# Patient Record
Sex: MALE | Race: WHITE | NOT HISPANIC OR LATINO | Employment: OTHER | URBAN - NONMETROPOLITAN AREA
[De-identification: names, ages, dates, MRNs, and addresses within clinical notes are randomized per-mention and may not be internally consistent; named-entity substitution may affect disease eponyms.]

---

## 2017-03-15 ENCOUNTER — OFFICE VISIT (OUTPATIENT)
Dept: CARDIOLOGY | Facility: CLINIC | Age: 62
End: 2017-03-15

## 2017-03-15 VITALS
OXYGEN SATURATION: 95 % | WEIGHT: 236.8 LBS | SYSTOLIC BLOOD PRESSURE: 140 MMHG | HEART RATE: 85 BPM | DIASTOLIC BLOOD PRESSURE: 85 MMHG | BODY MASS INDEX: 40.43 KG/M2 | HEIGHT: 64 IN

## 2017-03-15 DIAGNOSIS — I25.10 CORONARY ARTERY DISEASE INVOLVING NATIVE CORONARY ARTERY OF NATIVE HEART WITHOUT ANGINA PECTORIS: Primary | ICD-10-CM

## 2017-03-15 DIAGNOSIS — E78.2 MIXED HYPERLIPIDEMIA: ICD-10-CM

## 2017-03-15 DIAGNOSIS — R94.31 ABNORMAL EKG: ICD-10-CM

## 2017-03-15 DIAGNOSIS — I10 ESSENTIAL HYPERTENSION: ICD-10-CM

## 2017-03-15 DIAGNOSIS — R06.02 SHORTNESS OF BREATH: ICD-10-CM

## 2017-03-15 DIAGNOSIS — I25.10 CORONARY ARTERIOSCLEROSIS IN NATIVE ARTERY: ICD-10-CM

## 2017-03-15 DIAGNOSIS — R55 SYNCOPE AND COLLAPSE: ICD-10-CM

## 2017-03-15 DIAGNOSIS — E78.5 DYSLIPIDEMIA: ICD-10-CM

## 2017-03-15 PROCEDURE — 99214 OFFICE O/P EST MOD 30 MIN: CPT | Performed by: INTERNAL MEDICINE

## 2017-03-15 PROCEDURE — 93000 ELECTROCARDIOGRAM COMPLETE: CPT | Performed by: INTERNAL MEDICINE

## 2017-03-15 RX ORDER — FINASTERIDE 5 MG/1
TABLET, FILM COATED ORAL DAILY
Refills: 5 | COMMUNITY
Start: 2017-02-22

## 2017-03-15 NOTE — PROGRESS NOTES
"Subjective   Jose Reyes is a 61 y.o. male     Chief Complaint   Patient presents with   • Follow-up     6 month f/u        PROBLEM LIST:     1. Coronary artery disease, stenting November 2010  2. Hypertension  3. Dyslipidemia  4. Renal dysfunction     Specialty Problems        Cardiology Problems    Coronary arteriosclerosis in native artery        Hypertension        Syncope        Syncope and collapse                HPI:  Mr. Arnold returns for follow-up on coronary artery disease and cardiac risk factor modification.    He denies any symptoms of ischemia, heart failure, or dysrhythmia.  He has had no further \"spells\".  His strength and stamina have been stable.    Blood pressure and cholesterol have been well controlled per patient report.    Mr. Reyes denies any symptoms of peripheral arterial disease or arterial embolic events.    His EKG, which had changed significantly at the time of his last visit, has remained the same.  No further evaluation was performed at that time due to the recent unremarkable stress echo.  r              CURRENT MEDICATION:    Current Outpatient Prescriptions   Medication Sig Dispense Refill   • Ascorbic Acid (VITAMIN C) 500 MG capsule Take  by mouth daily.     • aspirin 81 MG tablet Take  by mouth daily.     • atorvastatin (LIPITOR) 20 MG tablet Take  by mouth.     • cloNIDine (CATAPRES) 0.1 MG tablet Take  by mouth Daily.     • ferrous sulfate 325 (65 FE) MG tablet Take  by mouth 3 (three) times a day.     • finasteride (PROSCAR) 5 MG tablet Daily.  5   • hydrochlorothiazide (HYDRODIURIL) 25 MG tablet Take  by mouth daily.     • loratadine-pseudoephedrine (CLARITIN-D 12 HOUR) 5-120 MG per 12 hr tablet Take  by mouth As Needed.     • losartan (COZAAR) 100 MG tablet Take  by mouth daily.     • omeprazole (PriLOSEC) 20 MG capsule Take  by mouth 2 (two) times a day.     • sucralfate (CARAFATE) 1 G tablet Take  by mouth 4 (four) times a day.       No current facility-administered " medications for this visit.        ALLERGIES:    Review of patient's allergies indicates no known allergies.    PAST MEDICAL HISTORY:    Past Medical History   Diagnosis Date   • Coronary artery disease    • Hyperlipidemia    • Hypertension        SURGICAL HISTORY:    Past Surgical History   Procedure Laterality Date   • Colonoscopy w/ biopsies     • Cystoscopy w/ ureteroscopy w/ lithotripsy     • Stomach surgery     • Small intestine surgery     • Umbilical hernia repair     • Incision and drainage arm     • Coronary angioplasty     • Coronary stent placement       x1       SOCIAL HISTORY:    Social History     Social History   • Marital status:      Spouse name: N/A   • Number of children: N/A   • Years of education: N/A     Occupational History   • Not on file.     Social History Main Topics   • Smoking status: Never Smoker   • Smokeless tobacco: Never Used   • Alcohol use No   • Drug use: No   • Sexual activity: Defer     Other Topics Concern   • Not on file     Social History Narrative       FAMILY HISTORY:    Family History   Problem Relation Age of Onset   • Heart disease Mother    • Hypertension Mother    • Diabetes Mother    • Hyperlipidemia Mother        Review of Systems   Constitutional: Negative.  Negative for chills, diaphoresis, fatigue, fever and unexpected weight change.   HENT: Negative.    Eyes: Positive for visual disturbance (wears glasses).   Respiratory: Positive for shortness of breath (with moderate exertion, no orthopnea or PND). Negative for chest tightness.    Cardiovascular: Negative.  Negative for chest pain (no failure or angina symptoms ), palpitations and leg swelling.   Gastrointestinal: Negative.  Negative for abdominal pain, blood in stool (no melena, no hematuria, no hematemesis, no hematochezia ), constipation, diarrhea, nausea and vomiting.   Endocrine: Negative.  Negative for cold intolerance and heat intolerance.   Genitourinary: Positive for difficulty urinating.  "  Musculoskeletal: Positive for myalgias.   Skin: Negative.    Allergic/Immunologic: Negative.    Neurological: Negative.  Negative for tremors, seizures, syncope, facial asymmetry, speech difficulty, weakness, light-headedness, numbness and headaches.   Hematological: Negative.    Psychiatric/Behavioral: Negative.        VISIT VITALS:    Visit Vitals   • /85 (BP Location: Left arm, Patient Position: Sitting)   • Pulse 85   • Ht 64\" (162.6 cm)   • Wt 236 lb 12.8 oz (107 kg)   • SpO2 95%   • BMI 40.65 kg/m2       RECENT LABS:    Objective       Physical Exam   Constitutional: He is oriented to person, place, and time. He appears well-developed and well-nourished. No distress.   HENT:   Head: Normocephalic and atraumatic.   Eyes: Conjunctivae, EOM and lids are normal. Pupils are equal, round, and reactive to light. Lids are everted and swept, no foreign bodies found.   Neck: Normal range of motion. Neck supple. Normal carotid pulses, no hepatojugular reflux and no JVD present. Carotid bruit is not present. No thyroid mass and no thyromegaly present.   Cardiovascular: Normal rate, regular rhythm and normal heart sounds.   No extrasystoles are present. Exam reveals no gallop and no friction rub.    No murmur heard.  Pulses:       Radial pulses are 2+ on the right side, and 2+ on the left side.        Dorsalis pedis pulses are 2+ on the right side, and 2+ on the left side.        Posterior tibial pulses are 2+ on the right side, and 2+ on the left side.   Pulmonary/Chest: Effort normal and breath sounds normal. No respiratory distress. He has no decreased breath sounds. He has no wheezes. He has no rhonchi. He has no rales. He exhibits no tenderness.   Abdominal: Soft. Bowel sounds are normal. He exhibits no distension, no abdominal bruit and no mass. There is no tenderness.   Negative organomegaly      Musculoskeletal: Normal range of motion. He exhibits no edema.   Neurological: He is alert and oriented to " person, place, and time. He has normal reflexes. He displays no tremor.   Skin: Skin is warm and dry. Rash noted. He is not diaphoretic. No erythema. No pallor.   Psychiatric: He has a normal mood and affect. His speech is normal and behavior is normal. Judgment and thought content normal. Cognition and memory are normal.   Nursing note and vitals reviewed.        ECG 12 Lead  Date/Time: 3/15/2017 9:22 AM  Performed by: GARY CASH  Authorized by: GARY CASH   Rhythm: sinus rhythm  Rate: normal  QRS axis: left  Comments: NST              Assessment/Plan   #1.  Coronary artery disease status post stenting, asymptomatic ischemia, heart failure, or dysrhythmia.    #2.  The patient is on appropriate medications, and blood pressures and cholesterol appeared to be at goal.  I would like to review most recent studies from Dr. Villanueva's office.    #3.  Rather marked but nonspecific EKG changes.  As these have remained stable over time, and were first noted after relatively unremarkable stress and echo, I don't think that further evaluation is indicated.  However, I did caution the patient reported immediately for any symptoms of recurrent ischemia, heart failure, or dysrhythmia.    #4.  Mr. Arnold will follow up with Dr. Villanueva's office as instructed and in our office in 1 year or on a when necessary basis for symptoms reviewed in detail today.               Diagnosis Plan   1. Coronary artery disease involving native coronary artery of native heart without angina pectoris  ECG 12 Lead   2. Essential hypertension  ECG 12 Lead   3. Mixed hyperlipidemia  ECG 12 Lead   4. Coronary arteriosclerosis in native artery     5. Dyslipidemia     6. Shortness of breath     7. Syncope and collapse     8. Abnormal EKG         Return in about 1 year (around 3/15/2018), or if symptoms worsen or fail to improve, for Next scheduled follow up.         Gary Cash MD

## 2019-03-20 ENCOUNTER — OFFICE VISIT (OUTPATIENT)
Dept: CARDIOLOGY | Facility: CLINIC | Age: 64
End: 2019-03-20

## 2019-03-20 VITALS
HEART RATE: 80 BPM | SYSTOLIC BLOOD PRESSURE: 141 MMHG | WEIGHT: 226.2 LBS | BODY MASS INDEX: 38.62 KG/M2 | OXYGEN SATURATION: 97 % | HEIGHT: 64 IN | DIASTOLIC BLOOD PRESSURE: 89 MMHG

## 2019-03-20 DIAGNOSIS — E78.5 DYSLIPIDEMIA: ICD-10-CM

## 2019-03-20 DIAGNOSIS — R06.83 SNORING: ICD-10-CM

## 2019-03-20 DIAGNOSIS — I10 ESSENTIAL HYPERTENSION: ICD-10-CM

## 2019-03-20 DIAGNOSIS — R55 SYNCOPE AND COLLAPSE: ICD-10-CM

## 2019-03-20 DIAGNOSIS — R53.83 OTHER FATIGUE: ICD-10-CM

## 2019-03-20 DIAGNOSIS — R94.31 ABNORMAL EKG: Primary | ICD-10-CM

## 2019-03-20 DIAGNOSIS — R01.1 HEART MURMUR: ICD-10-CM

## 2019-03-20 DIAGNOSIS — I25.10 CORONARY ARTERIOSCLEROSIS IN NATIVE ARTERY: ICD-10-CM

## 2019-03-20 DIAGNOSIS — R06.02 SHORTNESS OF BREATH: ICD-10-CM

## 2019-03-20 PROCEDURE — 99214 OFFICE O/P EST MOD 30 MIN: CPT | Performed by: INTERNAL MEDICINE

## 2019-03-20 PROCEDURE — 93000 ELECTROCARDIOGRAM COMPLETE: CPT | Performed by: INTERNAL MEDICINE

## 2019-03-20 RX ORDER — GLIPIZIDE 5 MG/1
TABLET, FILM COATED, EXTENDED RELEASE ORAL DAILY
COMMUNITY
Start: 2019-02-07

## 2019-03-20 RX ORDER — AMLODIPINE BESYLATE 5 MG/1
5 TABLET ORAL DAILY
COMMUNITY

## 2019-03-20 NOTE — PROGRESS NOTES
"Subjective   Jose Reyes is a 63 y.o. male     Chief Complaint   Patient presents with   • Coronary Artery Disease     Here for yearly f/u   • Hypertension   • Hyperlipidemia   • Loss of Consciousness       PROBLEM LIST:       1. Coronary artery disease, stenting November 2010  2. Hypertension  3. Dyslipidemia  4. Renal dysfunction          Specialty Problems        Cardiology Problems    Abnormal EKG        Coronary arteriosclerosis in native artery        Hypertension        Syncope        Syncope and collapse                HPI:  Mr. Reyes  returns for follow-up on coronary artery disease and cardiac risk factor modification.  He has also had a history of syncope in the distant past.    He has done well over the last year.  He continues to be without chest pain.  He also denies orthopnea, PND, lower extremity edema, palpitations, dizziness, presyncope, or syncope.  He describes blood pressures that are well controlled, and he states that his lipids are checked every 3 months through Dr. Villanueva's office that he was told Strahl was \"good\" and that no changes were needed.    The patient has had no symptoms to suggest TIA or stroke.  He does not claudicate and he describes no other symptoms of peripheral arterial disease or arterial embolic events.                    CURRENT MEDICATION:    Current Outpatient Medications   Medication Sig Dispense Refill   • amLODIPine (NORVASC) 5 MG tablet Take 5 mg by mouth Daily.     • Ascorbic Acid (VITAMIN C) 500 MG capsule Take  by mouth daily.     • aspirin 81 MG tablet Take  by mouth daily.     • atorvastatin (LIPITOR) 20 MG tablet Take 20 mg by mouth Every Night.     • cloNIDine (CATAPRES) 0.1 MG tablet Take  by mouth Daily.     • ferrous sulfate 325 (65 FE) MG tablet Take  by mouth 3 (three) times a day.     • finasteride (PROSCAR) 5 MG tablet Daily.  5   • glipiZIDE (GLUCOTROL XL) 5 MG ER tablet Daily.     • hydrochlorothiazide (HYDRODIURIL) 25 MG tablet Take  by mouth " daily.     • losartan (COZAAR) 100 MG tablet Take  by mouth daily.     • metFORMIN (GLUCOPHAGE) 500 MG tablet 2 (Two) Times a Day.     • omeprazole (PriLOSEC) 20 MG capsule Take  by mouth 2 (two) times a day.     • sucralfate (CARAFATE) 1 G tablet Take  by mouth 4 (four) times a day.       No current facility-administered medications for this visit.        ALLERGIES:    Patient has no known allergies.    PAST MEDICAL HISTORY:    Past Medical History:   Diagnosis Date   • Coronary artery disease    • Hyperlipidemia    • Hypertension        SURGICAL HISTORY:    Past Surgical History:   Procedure Laterality Date   • COLONOSCOPY W/ BIOPSIES     • CORONARY ANGIOPLASTY     • CORONARY STENT PLACEMENT      x1   • CYSTOSCOPY W/ URETEROSCOPY W/ LITHOTRIPSY     • INCISION AND DRAINAGE ARM     • SMALL INTESTINE SURGERY     • STOMACH SURGERY     • UMBILICAL HERNIA REPAIR         SOCIAL HISTORY:    Social History     Socioeconomic History   • Marital status:      Spouse name: Not on file   • Number of children: Not on file   • Years of education: Not on file   • Highest education level: Not on file   Tobacco Use   • Smoking status: Never Smoker   • Smokeless tobacco: Never Used   Substance and Sexual Activity   • Alcohol use: No   • Drug use: No   • Sexual activity: Defer       FAMILY HISTORY:    Family History   Problem Relation Age of Onset   • Heart disease Mother    • Hypertension Mother    • Diabetes Mother    • Hyperlipidemia Mother        Review of Systems   Constitutional: Negative.    HENT: Negative.    Eyes: Positive for visual disturbance (wears glasses).   Respiratory: Positive for shortness of breath (with exertion/over exertion).    Cardiovascular: Negative.    Gastrointestinal: Negative for blood in stool, constipation and diarrhea.   Endocrine: Negative.    Genitourinary: Negative.    Musculoskeletal: Positive for arthralgias and myalgias.   Skin: Negative.    Allergic/Immunologic: Negative.   "  Neurological: Negative.    Hematological: Negative.    Psychiatric/Behavioral: Negative.        VISIT VITALS:  Vitals:    03/20/19 0950   BP: 141/89   BP Location: Left arm   Patient Position: Sitting   Pulse: 80   SpO2: 97%   Weight: 103 kg (226 lb 3.2 oz)   Height: 162.6 cm (64\")      /89 (BP Location: Left arm, Patient Position: Sitting)   Pulse 80   Ht 162.6 cm (64\")   Wt 103 kg (226 lb 3.2 oz)   SpO2 97%   BMI 38.83 kg/m²     RECENT LABS:    Objective       Physical Exam   Constitutional: He is oriented to person, place, and time. He appears well-developed and well-nourished. No distress.   HENT:   Head: Normocephalic and atraumatic.   Eyes: Conjunctivae and EOM are normal. Pupils are equal, round, and reactive to light.   Neck: Normal range of motion. Neck supple. No hepatojugular reflux and no JVD present. Carotid bruit is not present. No tracheal deviation present.   Nl. Carotid upstrokes     Cardiovascular: Normal rate, regular rhythm, S1 normal, S2 normal and intact distal pulses. Exam reveals no S3.   Murmur heard.   Systolic murmur is present.  Pulses:       Radial pulses are 2+ on the right side, and 2+ on the left side.   Very soft 1/6 systolic ejection murmur RUSB and LUSB   Pulmonary/Chest: Effort normal and breath sounds normal. He has no wheezes. He has no rhonchi. He has no rales.   Nl. Expir. Phase  Nl. Breath sound intensity       Abdominal: Soft. Bowel sounds are normal. He exhibits no distension, no abdominal bruit and no mass. There is no tenderness. There is no rebound and no guarding.   No organomegaly   Musculoskeletal: Normal range of motion. He exhibits no edema, tenderness or deformity.   BLE, no edema above prosthetic braces     Neurological: He is alert and oriented to person, place, and time.   Skin: Skin is warm and dry. No rash noted. No erythema. No pallor.   Psychiatric: He has a normal mood and affect. His behavior is normal. Judgment and thought content normal. "   Nursing note and vitals reviewed.        ECG 12 Lead  Date/Time: 3/20/2019 10:26 AM  Performed by: Gary Cash MD  Authorized by: Gary Cash MD   Comments: Normal sinus rhythm.  Marked but nonspecific T wave inversion in in the inferior and lateral leads.  Prominent QRS voltage particularly in the precordial leads.  EKG is unchanged from prior.              Assessment/Plan   #1.  Coronary artery disease.  The patient is asymptomatic of ischemia, heart failure, or dysrhythmia.  Medications are appropriate, we will make no changes.    2.  From the standpoint of cardiac risk factor modification blood pressures are mildly increased today and the patient is not on high-dose statin.  I have asked Mr. Reyes to follow his blood pressures closely at home and email those to his next visit with his primary healthcare providers.  I would defer to them the decision to increased statin as lipid values are not currently available.    3.  We discussed symptoms which might represent ischemia, heart failure, or dysrhythmia.  Mr. Reyes will report any of these immediately.  Otherwise we will plan on seeing him on a yearly basis.   Diagnosis Plan   1. Abnormal EKG     2. Coronary arteriosclerosis in native artery     3. Essential hypertension     4. Syncope and collapse     5. Shortness of breath     6. Snoring     7. Dyslipidemia     8. Other fatigue         No Follow-up on file.              Patient's Body mass index is 38.83 kg/m². BMI is above normal parameters. Recommendations include: educational material and referral to primary care.       Anna Dan LPN    Scribed for Dr. Gary Cash by Anna Dan LPN March 20, 2019 10:03 AM         Electronically signed by:            This note is dictated utilizing voice recognition software.  Although this record has been proof read, transcriptional errors may still be present. If questions occur regarding the content of this record please do not hesitate to  call our office.

## 2019-03-20 NOTE — PATIENT INSTRUCTIONS
Obesity, Adult  Obesity is the condition of having too much total body fat. Being overweight or obese means that your weight is greater than what is considered healthy for your body size. Obesity is determined by a measurement called BMI. BMI is an estimate of body fat and is calculated from height and weight. For adults, a BMI of 30 or higher is considered obese.  Obesity can eventually lead to other health concerns and major illnesses, including:  · Stroke.  · Coronary artery disease (CAD).  · Type 2 diabetes.  · Some types of cancer, including cancers of the colon, breast, uterus, and gallbladder.  · Osteoarthritis.  · High blood pressure (hypertension).  · High cholesterol.  · Sleep apnea.  · Gallbladder stones.  · Infertility problems.    What are the causes?  The main cause of obesity is taking in (consuming) more calories than your body uses for energy. Other factors that contribute to this condition may include:  · Being born with genes that make you more likely to become obese.  · Having a medical condition that causes obesity. These conditions include:  ? Hypothyroidism.  ? Polycystic ovarian syndrome (PCOS).  ? Binge-eating disorder.  ? Cushing syndrome.  · Taking certain medicines, such as steroids, antidepressants, and seizure medicines.  · Not being physically active (sedentary lifestyle).  · Living where there are limited places to exercise safely or buy healthy foods.  · Not getting enough sleep.    What increases the risk?  The following factors may increase your risk of this condition:  · Having a family history of obesity.  · Being a woman of -American descent.  · Being a man of  descent.    What are the signs or symptoms?  Having excessive body fat is the main symptom of this condition.  How is this diagnosed?  This condition may be diagnosed based on:  · Your symptoms.  · Your medical history.  · A physical exam. Your health care provider may measure:  ? Your BMI. If you are an  adult with a BMI between 25 and less than 30, you are considered overweight. If you are an adult with a BMI of 30 or higher, you are considered obese.  ? The distances around your hips and your waist (circumferences). These may be compared to each other to help diagnose your condition.  ? Your skinfold thickness. Your health care provider may gently pinch a fold of your skin and measure it.    How is this treated?  Treatment for this condition often includes changing your lifestyle. Treatment may include some or all of the following:  · Dietary changes. Work with your health care provider and a dietitian to set a weight-loss goal that is healthy and reasonable for you. Dietary changes may include eating:  ? Smaller portions. A portion size is the amount of a particular food that is healthy for you to eat at one time. This varies from person to person.  ? Low-calorie or low-fat options.  ? More whole grains, fruits, and vegetables.  · Regular physical activity. This may include aerobic activity (cardio) and strength training.  · Medicine to help you lose weight. Your health care provider may prescribe medicine if you are unable to lose 1 pound a week after 6 weeks of eating more healthily and doing more physical activity.  · Surgery. Surgical options may include gastric banding and gastric bypass. Surgery may be done if:  ? Other treatments have not helped to improve your condition.  ? You have a BMI of 40 or higher.  ? You have life-threatening health problems related to obesity.    Follow these instructions at home:    Eating and drinking    · Follow recommendations from your health care provider about what you eat and drink. Your health care provider may advise you to:  ? Limit fast foods, sweets, and processed snack foods.  ? Choose low-fat options, such as low-fat milk instead of whole milk.  ? Eat 5 or more servings of fruits or vegetables every day.  ? Eat at home more often. This gives you more control over  what you eat.  ? Choose healthy foods when you eat out.  ? Learn what a healthy portion size is.  ? Keep low-fat snacks on hand.  ? Avoid sugary drinks, such as soda, fruit juice, iced tea sweetened with sugar, and flavored milk.  ? Eat a healthy breakfast.  · Drink enough water to keep your urine clear or pale yellow.  · Do not go without eating for long periods of time (do not fast) or follow a fad diet. Fasting and fad diets can be unhealthy and even dangerous.  Physical Activity  · Exercise regularly, as told by your health care provider. Ask your health care provider what types of exercise are safe for you and how often you should exercise.  · Warm up and stretch before being active.  · Cool down and stretch after being active.  · Rest between periods of activity.  Lifestyle  · Limit the time that you spend in front of your TV, computer, or video game system.  · Find ways to reward yourself that do not involve food.  · Limit alcohol intake to no more than 1 drink a day for nonpregnant women and 2 drinks a day for men. One drink equals 12 oz of beer, 5 oz of wine, or 1½ oz of hard liquor.  General instructions  · Keep a weight loss journal to keep track of the food you eat and how much you exercise you get.  · Take over-the-counter and prescription medicines only as told by your health care provider.  · Take vitamins and supplements only as told by your health care provider.  · Consider joining a support group. Your health care provider may be able to recommend a support group.  · Keep all follow-up visits as told by your health care provider. This is important.  Contact a health care provider if:  · You are unable to meet your weight loss goal after 6 weeks of dietary and lifestyle changes.  This information is not intended to replace advice given to you by your health care provider. Make sure you discuss any questions you have with your health care provider.  Document Released: 01/25/2006 Document Revised:  05/22/2017 Document Reviewed: 10/05/2016  [employer] Interactive Patient Education © 2019 [employer]  MyPlate from [employer]  MyPlate is an outline of a general healthy diet based on the 2010 Dietary Guidelines for Americans, from the U.S. Department of Agriculture (USDA). It sets guidelines for how much food you should eat from each food group based on your age, sex, and level of physical activity.  What are tips for following MyPlate?  To follow MyPlate recommendations:  · Eat a wide variety of fruits and vegetables, grains, and protein foods.  · Serve smaller portions and eat less food throughout the day.  · Limit portion sizes to avoid overeating.  · Enjoy your food.  · Get at least 150 minutes of exercise every week. This is about 30 minutes each day, 5 or more days per week.    It can be difficult to have every meal look like MyPlate. Think about MyPlate as eating guidelines for an entire day, rather than each individual meal.  Fruits and Vegetables  · Make half of your plate fruits and vegetables.  · Eat many different colors of fruits and vegetables each day.  · For a 2,000 calorie daily food plan, eat:  ? 2½ cups of vegetables every day.  ? 2 cups of fruit every day.  · 1 cup is equal to:  ? 1 cup raw or cooked vegetables.  ? 1 cup raw fruit.  ? 1 medium-sized orange, apple, or banana.  ? 1 cup 100% fruit or vegetable juice.  ? 2 cups raw leafy greens, such as lettuce, spinach, or kale.  ? ½ cup dried fruit.  Grains  · One fourth of your plate should be grains.  · Make at least half of the grains you eat each day whole grains.  · For a 2,000 calorie daily food plan, eat 6 oz of grains every day.  · 1 oz is equal to:  ? 1 slice bread.  ? 1 cup cereal.  ? ½ cup cooked rice, cereal, or pasta.  Protein  · One fourth of your plate should be protein.  · Eat a wide variety of protein foods, including meat, poultry, fish, eggs, beans, nuts, and tofu.  · For a 2,000 calorie daily food plan, eat 5½ oz of protein every  day.  · 1 oz is equal to:  ? 1 oz meat, poultry, or fish.  ? ¼ cup cooked beans.  ? 1 egg.  ? ½ oz nuts or seeds.  ? 1 Tbsp peanut butter.  Dairy  · Drink fat-free or low-fat (1%) milk.  · Eat or drink dairy as a side to meals.  · For a 2,000 calorie daily food plan, eat or drink 3 cups of dairy every day.  · 1 cup is equal to:  ? 1 cup milk, yogurt, cottage cheese, or soy milk (soy beverage).  ? 2 oz processed cheese.  ? 1½ oz natural cheese.  Fats, oils, salt, and sugars  · Only small amounts of oils are recommended.  · Avoid foods that are high in calories and low in nutritional value (empty calories), like foods high in fat or added sugars.  · Choose foods that are low in salt (sodium). Choose foods that have less than 140 milligrams (mg) of sodium per serving.  · Drink water instead of sugary drinks. Drink enough water each day to keep your urine pale yellow.  Where to find support  · Work with your health care provider or a nutrition specialist (dietitian) to develop a customized eating plan that is right for you.  · Download an lei (mobile application) to help you track your daily food intake.  Where to find more information  · Go to ChooseMyPlate.gov for more information.  · Learn more and log your daily food intake according to MyPlate using USDA's SuperTracker: www.supertracker.usda.gov  Summary  · MyPlate is a general guideline for healthy eating from the USDA. It is based on the 2010 Dietary Guidelines for Americans.  · In general, fruits and vegetables should take up ½ of your plate, grains should take up ¼ of your plate, and protein should take up ¼ of your plate.  This information is not intended to replace advice given to you by your health care provider. Make sure you discuss any questions you have with your health care provider.  Document Released: 01/06/2009 Document Revised: 03/19/2018 Document Reviewed: 03/19/2018  [employer] Interactive Patient Education © 2019 [employer]

## 2020-07-15 ENCOUNTER — OFFICE VISIT (OUTPATIENT)
Dept: CARDIOLOGY | Facility: CLINIC | Age: 65
End: 2020-07-15

## 2020-07-15 VITALS
OXYGEN SATURATION: 97 % | SYSTOLIC BLOOD PRESSURE: 139 MMHG | HEART RATE: 70 BPM | DIASTOLIC BLOOD PRESSURE: 87 MMHG | HEIGHT: 64 IN | WEIGHT: 233.6 LBS | BODY MASS INDEX: 39.88 KG/M2

## 2020-07-15 DIAGNOSIS — I25.10 CORONARY ARTERIOSCLEROSIS IN NATIVE ARTERY: ICD-10-CM

## 2020-07-15 DIAGNOSIS — R06.02 SHORTNESS OF BREATH: ICD-10-CM

## 2020-07-15 DIAGNOSIS — R94.31 ABNORMAL EKG: Primary | ICD-10-CM

## 2020-07-15 DIAGNOSIS — R53.83 OTHER FATIGUE: ICD-10-CM

## 2020-07-15 DIAGNOSIS — I10 ESSENTIAL HYPERTENSION: ICD-10-CM

## 2020-07-15 DIAGNOSIS — R01.1 HEART MURMUR: ICD-10-CM

## 2020-07-15 DIAGNOSIS — E78.5 DYSLIPIDEMIA: ICD-10-CM

## 2020-07-15 DIAGNOSIS — R55 SYNCOPE AND COLLAPSE: ICD-10-CM

## 2020-07-15 PROCEDURE — 99214 OFFICE O/P EST MOD 30 MIN: CPT | Performed by: INTERNAL MEDICINE

## 2020-07-15 PROCEDURE — 93000 ELECTROCARDIOGRAM COMPLETE: CPT | Performed by: INTERNAL MEDICINE

## 2020-07-15 NOTE — PROGRESS NOTES
Subjective   Jose Reyes is a 64 y.o. male     Chief Complaint   Patient presents with   • Coronary Artery Disease     Here for yearly f/u   • Hypertension   • Hyperlipidemia   • Loss of Consciousness       PROBLEM LIST:     1. Coronary artery disease, stenting November 2010  2. Hypertension  3. Dyslipidemia  4. Renal dysfunction      Specialty Problems        Cardiology Problems    Abnormal EKG        Coronary arteriosclerosis in native artery        Hypertension        Syncope and collapse        Heart murmur                HPI:  Mr. Reyes returns for follow-up on coronary artery disease and cardiac risk factor modification.    He is done well over the last year.  He denies chest pain, orthopnea, PND, or change in lower extremity edema.  He does not palpitate, he has no dizziness, presyncope, or syncope.  Does not claudicate and he describes no other symptoms of peripheral arterial disease.  He also denies symptoms of arterial embolic events, specifically, he has had no symptoms to suggest TIA or stroke.    Blood pressures have been well controlled per patient report and generally run today's levels or lower.  Mr. Reyes also states that his cholesterols are checked regularly through Dr. Villanueva's office and that he is always told that his cholesterol is being appropriately treated.  We will attempt to review most recent results.    In reviewing old records prior to today's visit was that the  patient had no angina prior to stenting.  He complained only of anergy, fatigue, and decreased exercise capacity.  Because of severe orthopedic problems I do not feel that we can safely utilize functional capacity as a marker for the absence of ischemia.                    PRIOR MEDICATIONS    Current Outpatient Medications on File Prior to Visit   Medication Sig Dispense Refill   • amLODIPine (NORVASC) 5 MG tablet Take 5 mg by mouth Daily.     • Ascorbic Acid (VITAMIN C) 500 MG capsule Take  by mouth daily.     •  aspirin 81 MG tablet Take  by mouth daily.     • atorvastatin (LIPITOR) 20 MG tablet Take 20 mg by mouth Every Night.     • cloNIDine (CATAPRES) 0.1 MG tablet Take  by mouth Daily.     • ferrous sulfate 325 (65 FE) MG tablet Take  by mouth 3 (three) times a day.     • finasteride (PROSCAR) 5 MG tablet Daily.  5   • glipiZIDE (GLUCOTROL XL) 5 MG ER tablet Daily.     • hydrochlorothiazide (HYDRODIURIL) 25 MG tablet Take  by mouth daily.     • losartan (COZAAR) 100 MG tablet Take  by mouth daily.     • metFORMIN (GLUCOPHAGE) 500 MG tablet 2 (Two) Times a Day.     • omeprazole (PriLOSEC) 20 MG capsule Take  by mouth 2 (two) times a day.     • sucralfate (CARAFATE) 1 G tablet Take  by mouth 4 (four) times a day.       No current facility-administered medications on file prior to visit.        ALLERGIES:    Patient has no known allergies.    PAST MEDICAL HISTORY:    Past Medical History:   Diagnosis Date   • Coronary artery disease    • Hyperlipidemia    • Hypertension        SURGICAL HISTORY:    Past Surgical History:   Procedure Laterality Date   • COLONOSCOPY W/ BIOPSIES     • CORONARY ANGIOPLASTY     • CORONARY STENT PLACEMENT      x1   • CYSTOSCOPY W/ URETEROSCOPY W/ LITHOTRIPSY     • INCISION AND DRAINAGE ARM     • SMALL INTESTINE SURGERY     • STOMACH SURGERY     • UMBILICAL HERNIA REPAIR         SOCIAL HISTORY:    Social History     Socioeconomic History   • Marital status:      Spouse name: Not on file   • Number of children: Not on file   • Years of education: Not on file   • Highest education level: Not on file   Tobacco Use   • Smoking status: Never Smoker   • Smokeless tobacco: Never Used   Substance and Sexual Activity   • Alcohol use: No   • Drug use: No   • Sexual activity: Defer       FAMILY HISTORY:    Family History   Problem Relation Age of Onset   • Heart disease Mother    • Hypertension Mother    • Diabetes Mother    • Hyperlipidemia Mother        Review of Systems   Constitutional: Negative.  "   HENT: Negative.    Eyes: Positive for visual disturbance (wears glasses).   Respiratory: Negative.         Denies orthopnea/PND   Cardiovascular: Positive for leg swelling (usual). Negative for chest pain and palpitations.   Gastrointestinal: Negative.    Endocrine: Negative.    Genitourinary: Negative.    Musculoskeletal: Positive for arthralgias and myalgias.        Denies leg cramps with ambulation   Skin: Negative.    Allergic/Immunologic: Negative.    Neurological: Negative.         Denies stroke like sx's   Hematological: Negative.    Psychiatric/Behavioral: Negative.        VISIT VITALS:  Vitals:    07/15/20 0936   BP: 139/87   BP Location: Left arm   Patient Position: Sitting   Pulse: 70   SpO2: 97%   Weight: 106 kg (233 lb 9.6 oz)   Height: 162.6 cm (64\")      /87 (BP Location: Left arm, Patient Position: Sitting)   Pulse 70   Ht 162.6 cm (64\")   Wt 106 kg (233 lb 9.6 oz)   SpO2 97%   BMI 40.10 kg/m²     RECENT LABS:    Objective       Physical Exam   Constitutional: He is oriented to person, place, and time. He appears well-developed and well-nourished. No distress.   HENT:   Head: Normocephalic and atraumatic.   Eyes: Pupils are equal, round, and reactive to light. Conjunctivae and EOM are normal.   Neck: Normal range of motion. Neck supple. No hepatojugular reflux and no JVD present. Carotid bruit is not present. No tracheal deviation present.   Nl. Carotid upstrokes   Cardiovascular: Normal rate, regular rhythm, S1 normal, S2 normal and intact distal pulses. Exam reveals gallop and S4. Exam reveals no S3 and no friction rub.   No murmur heard.  Pulses:       Radial pulses are 2+ on the right side, and 2+ on the left side.   Pulmonary/Chest: Effort normal and breath sounds normal. He has no wheezes. He has no rhonchi. He has no rales.   Nl. Expir. Phase  Mildly decreased Breath sound intensity   Abdominal: Soft. Bowel sounds are normal. He exhibits no distension, no abdominal bruit and no " mass. There is no tenderness. There is no rebound and no guarding.   No organomegaly   Musculoskeletal: Normal range of motion. He exhibits edema. He exhibits no tenderness or deformity.   BLE, trace edema, compression hose, unable to palpate pedal pulses due to right ankle brace and RLE brace     Neurological: He is alert and oriented to person, place, and time.   Skin: Skin is warm and dry. No rash noted. No erythema. No pallor.   Psychiatric: He has a normal mood and affect. His behavior is normal. Judgment and thought content normal.   Nursing note and vitals reviewed.        ECG 12 Lead  Date/Time: 7/15/2020 9:40 AM  Performed by: Gary Cash MD  Authorized by: Gary Cash MD   Comparison: compared with previous ECG from 3/20/2019  Similar to previous ECG  Comments: Normal sinus rhythm.  Nonspecific ST segment T wave changes.  No change from prior.              Assessment/Plan   #1.  Coronary artery disease status post stenting of the LAD in 2010.  See discussion above.  We will perform pharmacologic stress testing to exclude ischemia in this patient without an anginal warning mechanism who is unable to perform at even moderate levels of functional activity because of orthopedic problems.    2.  Systemic hypertension.  Blood pressures are reasonably well controlled.  We will make no changes.    3.  Treated dyslipidemia.  We will attempt to obtain labs from Dr. Villanueva's office.    4.  Diabetes being followed by Dr. Villanueva    5.  We discussed today symptoms of ischemia, heart failure, and malignant dysrhythmia.  The patient will report any of these immediately.  If stress testing is without high risk markers we will plan on seeing the patient in 1 year or on a as needed basis.  If stress testing demonstrates high risk I would feel compelled to proceed with invasive evaluation based on history as described above.  Mr. Reyes will follow with Dr. Villanueva's office as instructed.   Diagnosis Plan   1.  Abnormal EKG     2. Coronary arteriosclerosis in native artery     3. Heart murmur     4. Essential hypertension     5. Syncope and collapse     6. Shortness of breath     7. Dyslipidemia     8. Other fatigue         No follow-ups on file.         Jose Reyes  reports that he has never smoked. He has never used smokeless tobacco.. I have educated him on the risk of diseases from using tobacco products such as cancer, COPD and heart diease.             Patient's Body mass index is 40.1 kg/m². BMI is above normal parameters. Recommendations include: educational material and referral to primary care.       Anna Dan LPN    Scribed for Dr. Gary Cash by Anna Dan LPN July 15, 2020 09:39         Electronically signed by:            This note is dictated utilizing voice recognition software.  Although this record has been proof read, transcriptional errors may still be present. If questions occur regarding the content of this record please do not hesitate to call our office.

## 2020-07-15 NOTE — PATIENT INSTRUCTIONS
Obesity, Adult  Obesity is the condition of having too much total body fat. Being overweight or obese means that your weight is greater than what is considered healthy for your body size. Obesity is determined by a measurement called BMI. BMI is an estimate of body fat and is calculated from height and weight. For adults, a BMI of 30 or higher is considered obese.  Obesity can lead to other health concerns and major illnesses, including:  · Stroke.  · Coronary artery disease (CAD).  · Type 2 diabetes.  · Some types of cancer, including cancers of the colon, breast, uterus, and gallbladder.  · Osteoarthritis.  · High blood pressure (hypertension).  · High cholesterol.  · Sleep apnea.  · Gallbladder stones.  · Infertility problems.  What are the causes?  Common causes of this condition include:  · Eating daily meals that are high in calories, sugar, and fat.  · Being born with genes that may make you more likely to become obese.  · Having a medical condition that causes obesity, including:  ? Hypothyroidism.  ? Polycystic ovarian syndrome (PCOS).  ? Binge-eating disorder.  ? Cushing syndrome.  · Taking certain medicines, such as steroids, antidepressants, and seizure medicines.  · Not being physically active (sedentary lifestyle).  · Not getting enough sleep.  · Drinking high amounts of sugar-sweetened beverages, such as soft drinks.  What increases the risk?  The following factors may make you more likely to develop this condition:  · Having a family history of obesity.  · Being a woman of  descent.  · Being a man of  descent.  · Living in an area with limited access to:  ? [place], recreation centers, or sidewalks.  ? Healthy food choices, such as grocery stores and farmers' markets.  What are the signs or symptoms?  The main sign of this condition is having too much body fat.  How is this diagnosed?  This condition is diagnosed based on:  · Your BMI. If you are an adult with a BMI of 30 or  higher, you are considered obese.  · Your waist circumference. This measures the distance around your waistline.  · Your skinfold thickness. Your health care provider may gently pinch a fold of your skin and measure it.  You may have other tests to check for underlying conditions.  How is this treated?  Treatment for this condition often includes changing your lifestyle. Treatment may include some or all of the following:  · Dietary changes. This may include developing a healthy meal plan.  · Regular physical activity. This may include activity that causes your heart to beat faster (aerobic exercise) and strength training. Work with your health care provider to design an exercise program that works for you.  · Medicine to help you lose weight if you are unable to lose 1 pound a week after 6 weeks of healthy eating and more physical activity.  · Treating conditions that cause the obesity (underlying conditions).  · Surgery. Surgical options may include gastric banding and gastric bypass. Surgery may be done if:  ? Other treatments have not helped to improve your condition.  ? You have a BMI of 40 or higher.  ? You have life-threatening health problems related to obesity.  Follow these instructions at home:  Eating and drinking    · Follow recommendations from your health care provider about what you eat and drink. Your health care provider may advise you to:  ? Limit fast food, sweets, and processed snack foods.  ? Choose low-fat options, such as low-fat milk instead of whole milk.  ? Eat 5 or more servings of fruits or vegetables every day.  ? Eat at home more often. This gives you more control over what you eat.  ? Choose healthy foods when you eat out.  ? Learn to read food labels. This will help you understand how much food is considered 1 serving.  ? Learn what a healthy serving size is.  ? Keep low-fat snacks available.  ? Limit sugary drinks, such as soda, fruit juice, sweetened iced tea, and flavored  milk.  · Drink enough water to keep your urine pale yellow.  · Do not follow a fad diet. Fad diets can be unhealthy and even dangerous.  Physical activity  · Exercise regularly, as told by your health care provider.  ? Most adults should get up to 150 minutes of moderate-intensity exercise every week.  ? Ask your health care provider what types of exercise are safe for you and how often you should exercise.  · Warm up and stretch before being active.  · Cool down and stretch after being active.  · Rest between periods of activity.  Lifestyle  · Work with your health care provider and a dietitian to set a weight-loss goal that is healthy and reasonable for you.  · Limit your screen time.  · Find ways to reward yourself that do not involve food.  · Do not drink alcohol if:  ? Your health care provider tells you not to drink.  ? You are pregnant, may be pregnant, or are planning to become pregnant.  · If you drink alcohol:  ? Limit how much you use to:  § 0-1 drink a day for women.  § 0-2 drinks a day for men.  ? Be aware of how much alcohol is in your drink. In the [place] one drink equals one 12 oz bottle of beer (355 mL), one 5 oz glass of wine (148 mL), or one 1½ oz glass of hard liquor (44 mL).  General instructions  · Keep a weight-loss journal to keep track of the food you eat and how much exercise you get.  · Take over-the-counter and prescription medicines only as told by your health care provider.  · Take vitamins and supplements only as told by your health care provider.  · Consider joining a support group. Your health care provider may be able to recommend a support group.  · Keep all follow-up visits as told by your health care provider. This is important.  Contact a health care provider if:  · You are unable to meet your weight loss goal after 6 weeks of dietary and lifestyle changes.  Get help right away if you are having:  · Trouble breathing.  · Suicidal thoughts or behaviors.  Summary  · Obesity is the  condition of having too much total body fat.  · Being overweight or obese means that your weight is greater than what is considered healthy for your body size.  · Work with your health care provider and a dietitian to set a weight-loss goal that is healthy and reasonable for you.  · Exercise regularly, as told by your health care provider. Ask your health care provider what types of exercise are safe for you and how often you should exercise.  This information is not intended to replace advice given to you by your health care provider. Make sure you discuss any questions you have with your health care provider.  Document Released: 01/25/2006 Document Revised: 08/22/2019 Document Reviewed: 08/22/2019  [employer] Patient Education © 2020 [employer]  MyPlate from [employer]    MyPlate is an outline of a general healthy diet based on the 2010 Dietary Guidelines for Americans, from the [place] Department of Agriculture (USDA). It sets guidelines for how much food you should eat from each food group based on your age, sex, and level of physical activity.  What are tips for following MyPlate?  To follow MyPlate recommendations:  · Eat a wide variety of fruits and vegetables, grains, and protein foods.  · Serve smaller portions and eat less food throughout the day.  · Limit portion sizes to avoid overeating.  · Enjoy your food.  · Get at least 150 minutes of exercise every week. This is about 30 minutes each day, 5 or more days per week.  It can be difficult to have every meal look like MyPlate. Think about MyPlate as eating guidelines for an entire day, rather than each individual meal.  Fruits and vegetables  · Make half of your plate fruits and vegetables.  · Eat many different colors of fruits and vegetables each day.  · For a 2,000 calorie daily food plan, eat:  ? 2½ cups of vegetables every day.  ? 2 cups of fruit every day.  · 1 cup is equal to:  ? 1 cup raw or cooked vegetables.  ? 1 cup raw fruit.  ? 1 medium-sized orange,  apple, or banana.  ? 1 cup 100% fruit or vegetable juice.  ? 2 cups raw leafy greens, such as lettuce, spinach, or kale.  ? ½ cup dried fruit.  Grains  · One fourth of your plate should be grains.  · Make at least half of the grains you eat each day whole grains.  · For a 2,000 calorie daily food plan, eat 6 oz of grains every day.  · 1 oz is equal to:  ? 1 slice bread.  ? 1 cup cereal.  ? ½ cup cooked rice, cereal, or pasta.  Protein  · One fourth of your plate should be protein.  · Eat a wide variety of protein foods, including meat, poultry, fish, eggs, beans, nuts, and tofu.  · For a 2,000 calorie daily food plan, eat 5½ oz of protein every day.  · 1 oz is equal to:  ? 1 oz meat, poultry, or fish.  ? ¼ cup cooked beans.  ? 1 egg.  ? ½ oz nuts or seeds.  ? 1 Tbsp peanut butter.  Dairy  · Drink fat-free or low-fat (1%) milk.  · Eat or drink dairy as a side to meals.  · For a 2,000 calorie daily food plan, eat or drink 3 cups of dairy every day.  · 1 cup is equal to:  ? 1 cup milk, yogurt, cottage cheese, or soy milk (soy beverage).  ? 2 oz processed cheese.  ? 1½ oz natural cheese.  Fats, oils, salt, and sugars  · Only small amounts of oils are recommended.  · Avoid foods that are high in calories and low in nutritional value (empty calories), like foods high in fat or added sugars.  · Choose foods that are low in salt (sodium). Choose foods that have less than 140 milligrams (mg) of sodium per serving.  · Drink water instead of sugary drinks. Drink enough water each day to keep your urine pale yellow.  Where to find support  · Work with your health care provider or a nutrition specialist (dietitian) to develop a customized eating plan that is right for you.  · Download an lei (mobile application) to help you track your daily food intake.  Where to find more information  · Go to ChooseMyPlate.gov for more information.  · Learn more and log your daily food intake according to MyPlate using USDA's SuperTracker:  www.supertracker.usda.gov  Summary  · MyPlate is a general guideline for healthy eating from the USDA. It is based on the 2010 Dietary Guidelines for Americans.  · In general, fruits and vegetables should take up ½ of your plate, grains should take up ¼ of your plate, and protein should take up ¼ of your plate.  This information is not intended to replace advice given to you by your health care provider. Make sure you discuss any questions you have with your health care provider.  Document Released: 01/06/2009 Document Revised: 01/19/2019 Document Reviewed: 03/19/2018  [employer] Patient Education © 2020 [employer]

## 2020-08-21 ENCOUNTER — APPOINTMENT (OUTPATIENT)
Dept: CARDIOLOGY | Facility: HOSPITAL | Age: 65
End: 2020-08-21

## 2020-08-28 ENCOUNTER — HOSPITAL ENCOUNTER (OUTPATIENT)
Dept: CARDIOLOGY | Facility: HOSPITAL | Age: 65
Discharge: HOME | End: 2020-08-28

## 2020-08-28 DIAGNOSIS — E78.5 DYSLIPIDEMIA: ICD-10-CM

## 2020-08-28 DIAGNOSIS — R53.83 OTHER FATIGUE: ICD-10-CM

## 2020-08-28 DIAGNOSIS — I25.10 CORONARY ARTERIOSCLEROSIS IN NATIVE ARTERY: ICD-10-CM

## 2020-08-28 DIAGNOSIS — R55 SYNCOPE AND COLLAPSE: ICD-10-CM

## 2020-08-28 DIAGNOSIS — R01.1 HEART MURMUR: ICD-10-CM

## 2020-08-28 DIAGNOSIS — I10 ESSENTIAL HYPERTENSION: ICD-10-CM

## 2020-08-28 LAB
BH CV STRESS COMMENTS STAGE 1: NORMAL
BH CV STRESS DOSE REGADENOSON STAGE 1: 0.4
BH CV STRESS DURATION MIN STAGE 1: 0
BH CV STRESS DURATION SEC STAGE 1: 10
BH CV STRESS PROTOCOL 1: NORMAL
BH CV STRESS RECOVERY BP: NORMAL MMHG
BH CV STRESS RECOVERY HR: 84 BPM
BH CV STRESS STAGE 1: 1
MAXIMAL PREDICTED HEART RATE: 155 BPM
PERCENT MAX PREDICTED HR: 54.19 %
STRESS BASELINE BP: NORMAL MMHG
STRESS BASELINE HR: 63 BPM
STRESS PERCENT HR: 64 %
STRESS POST PEAK BP: NORMAL MMHG
STRESS POST PEAK HR: 84 BPM
STRESS TARGET HR: 132 BPM

## 2020-08-28 PROCEDURE — 0 TECHNETIUM SESTAMIBI: Performed by: INTERNAL MEDICINE

## 2020-08-28 PROCEDURE — 78452 HT MUSCLE IMAGE SPECT MULT: CPT

## 2020-08-28 PROCEDURE — 93016 CV STRESS TEST SUPVJ ONLY: CPT | Performed by: NURSE PRACTITIONER

## 2020-08-28 PROCEDURE — A9500 TC99M SESTAMIBI: HCPCS | Performed by: INTERNAL MEDICINE

## 2020-08-28 PROCEDURE — 93017 CV STRESS TEST TRACING ONLY: CPT

## 2020-08-28 PROCEDURE — 78452 HT MUSCLE IMAGE SPECT MULT: CPT | Performed by: INTERNAL MEDICINE

## 2020-08-28 PROCEDURE — 93018 CV STRESS TEST I&R ONLY: CPT | Performed by: INTERNAL MEDICINE

## 2020-08-28 PROCEDURE — 25010000002 REGADENOSON 0.4 MG/5ML SOLUTION: Performed by: INTERNAL MEDICINE

## 2020-08-28 RX ADMIN — TECHNETIUM TC 99M SESTAMIBI 1 DOSE: 1 INJECTION INTRAVENOUS at 09:21

## 2020-08-28 RX ADMIN — REGADENOSON 0.4 MG: 0.08 INJECTION, SOLUTION INTRAVENOUS at 11:14

## 2020-08-28 RX ADMIN — TECHNETIUM TC 99M SESTAMIBI 1 DOSE: 1 INJECTION INTRAVENOUS at 11:14

## 2020-09-02 ENCOUNTER — TELEPHONE (OUTPATIENT)
Dept: CARDIOLOGY | Facility: CLINIC | Age: 65
End: 2020-09-02

## 2020-09-02 NOTE — TELEPHONE ENCOUNTER
NOTIFIED OF STRESS TEST RESULTS. PH,LPN          ----- Message from Gary Cash MD sent at 9/1/2020  5:42 PM EDT -----  Keep yearly f/u.

## 2021-07-14 ENCOUNTER — OFFICE VISIT (OUTPATIENT)
Dept: CARDIOLOGY | Facility: CLINIC | Age: 66
End: 2021-07-14

## 2021-07-14 VITALS
WEIGHT: 251 LBS | SYSTOLIC BLOOD PRESSURE: 125 MMHG | BODY MASS INDEX: 42.85 KG/M2 | HEIGHT: 64 IN | DIASTOLIC BLOOD PRESSURE: 82 MMHG | HEART RATE: 95 BPM | OXYGEN SATURATION: 97 %

## 2021-07-14 DIAGNOSIS — R01.1 HEART MURMUR: ICD-10-CM

## 2021-07-14 DIAGNOSIS — R55 SYNCOPE AND COLLAPSE: ICD-10-CM

## 2021-07-14 DIAGNOSIS — I10 ESSENTIAL HYPERTENSION: ICD-10-CM

## 2021-07-14 DIAGNOSIS — E78.5 DYSLIPIDEMIA: ICD-10-CM

## 2021-07-14 DIAGNOSIS — R53.83 OTHER FATIGUE: ICD-10-CM

## 2021-07-14 DIAGNOSIS — I25.10 CORONARY ARTERIOSCLEROSIS IN NATIVE ARTERY: Primary | ICD-10-CM

## 2021-07-14 DIAGNOSIS — R06.02 SHORTNESS OF BREATH: ICD-10-CM

## 2021-07-14 PROBLEM — C61 PROSTATE CANCER (HCC): Status: ACTIVE | Noted: 2021-07-14

## 2021-07-14 PROCEDURE — 99214 OFFICE O/P EST MOD 30 MIN: CPT | Performed by: INTERNAL MEDICINE

## 2021-07-14 RX ORDER — ATORVASTATIN CALCIUM 10 MG/1
10 TABLET, FILM COATED ORAL NIGHTLY
Qty: 30 TABLET | Refills: 11 | Status: SHIPPED | OUTPATIENT
Start: 2021-07-14 | End: 2022-07-14 | Stop reason: ALTCHOICE

## 2021-07-14 RX ORDER — FLUTICASONE PROPIONATE 50 MCG
1 SPRAY, SUSPENSION (ML) NASAL DAILY
COMMUNITY
Start: 2021-06-08

## 2021-07-14 RX ORDER — FENOFIBRATE 160 MG/1
160 TABLET ORAL DAILY
COMMUNITY
Start: 2021-06-08

## 2021-07-14 NOTE — PROGRESS NOTES
Subjective   Jose Reyes is a 65 y.o. male     Chief Complaint   Patient presents with   • Follow-up     Here for yearly f/u   • Coronary Artery Disease   • Hyperlipidemia   • Hypertension   • Heart Murmur       PROBLEM LIST:     1. Coronary artery disease, stenting November 2010  2. Hypertension  3. Dyslipidemia  4. Renal dysfunction  5. Prostate CA, currently receiving radiation      Specialty Problems        Cardiology Problems    Abnormal EKG        Coronary arteriosclerosis in native artery        Hypertension        Syncope and collapse        Heart murmur                HPI    Mr. Reyes returns for follow-up on the above.    Stress test last year done because of exertional dyspnea (the patient had no angina prior to LAD stenting), demonstrated no evidence of ischemia and preserved LV systolic function.  He continues to be without chest pain and denies orthopnea, PND, or change in mild lower extremity edema.  He denies claudication or any symptoms of TIA or stroke.    Mr. Reyes was diagnosed with prostate cancer and is been undergoing radiation therapy.  Because of that he has been less active and has gained weight.  Despite that he feels his functional capacity has remained stable.    Pressures of been extremely well controlled and recent lipids demonstrated a total cholesterol of 115, and HDL of 20, and LDL 36.  Triglycerides were added.  Apparently fenofibrate was added since the patient's last visit because of elevated triglycerides.                  PRIOR MEDICATIONS    Current Outpatient Medications on File Prior to Visit   Medication Sig Dispense Refill   • amLODIPine (NORVASC) 5 MG tablet Take 5 mg by mouth Daily.     • Ascorbic Acid (VITAMIN C) 500 MG capsule Take  by mouth daily.     • aspirin 81 MG tablet Take  by mouth daily.     • atorvastatin (LIPITOR) 20 MG tablet Take 20 mg by mouth Every Night.     • cloNIDine (CATAPRES) 0.1 MG tablet Take 0.1 mg by mouth Every Night.     • fenofibrate  160 MG tablet Take 160 mg by mouth Daily.     • ferrous sulfate 325 (65 FE) MG tablet Take  by mouth 3 (three) times a day.     • finasteride (PROSCAR) 5 MG tablet Daily.  5   • fluticasone (FLONASE) 50 MCG/ACT nasal spray 1 spray by Each Nare route Daily.     • glipiZIDE (GLUCOTROL XL) 5 MG ER tablet Daily.     • losartan (COZAAR) 100 MG tablet Take  by mouth daily.     • metFORMIN (GLUCOPHAGE) 500 MG tablet 2 (Two) Times a Day.     • omeprazole (PriLOSEC) 20 MG capsule Take  by mouth 2 (two) times a day.     • sucralfate (CARAFATE) 1 G tablet Take  by mouth 4 (four) times a day.     • [DISCONTINUED] hydrochlorothiazide (HYDRODIURIL) 25 MG tablet Take  by mouth daily.       No current facility-administered medications on file prior to visit.       ALLERGIES:    Patient has no known allergies.    PAST MEDICAL HISTORY:    Past Medical History:   Diagnosis Date   • Cancer (CMS/HCC)     prostate CA with radiation   • Coronary artery disease    • Hyperlipidemia    • Hypertension        SURGICAL HISTORY:    Past Surgical History:   Procedure Laterality Date   • COLONOSCOPY W/ BIOPSIES     • CORONARY ANGIOPLASTY     • CORONARY STENT PLACEMENT      x1   • CYSTOSCOPY W/ URETEROSCOPY W/ LITHOTRIPSY     • INCISION AND DRAINAGE ARM     • SMALL INTESTINE SURGERY     • STOMACH SURGERY     • UMBILICAL HERNIA REPAIR         SOCIAL HISTORY:    Social History     Socioeconomic History   • Marital status:      Spouse name: Not on file   • Number of children: Not on file   • Years of education: Not on file   • Highest education level: Not on file   Tobacco Use   • Smoking status: Never Smoker   • Smokeless tobacco: Never Used   Substance and Sexual Activity   • Alcohol use: No   • Drug use: No   • Sexual activity: Defer       FAMILY HISTORY:    Family History   Problem Relation Age of Onset   • Heart disease Mother    • Hypertension Mother    • Diabetes Mother    • Hyperlipidemia Mother        Review of Systems  "  Constitutional: Positive for fatigue.   HENT: Negative.    Eyes: Positive for visual disturbance (wears glasses).   Respiratory: Positive for shortness of breath (r/t being \"low on blood\").    Cardiovascular: Negative.    Gastrointestinal: Negative.    Endocrine: Negative.    Genitourinary: Negative.    Musculoskeletal: Positive for arthralgias and myalgias.   Skin: Negative.    Allergic/Immunologic: Positive for environmental allergies.   Neurological: Negative.    Hematological: Negative.    Psychiatric/Behavioral: Negative.        VISIT VITALS:  Vitals:    07/14/21 0844   BP: 125/82   BP Location: Left arm   Patient Position: Sitting   Pulse: 95   SpO2: 97%   Weight: 114 kg (251 lb)   Height: 162.6 cm (64.02\")      /82 (BP Location: Left arm, Patient Position: Sitting)   Pulse 95   Ht 162.6 cm (64.02\")   Wt 114 kg (251 lb)   SpO2 97%   BMI 43.06 kg/m²     RECENT LABS:    Objective       Physical Exam  Vitals and nursing note reviewed.   Constitutional:       General: He is not in acute distress.     Appearance: He is well-developed.   HENT:      Head: Normocephalic and atraumatic.   Eyes:      Conjunctiva/sclera: Conjunctivae normal.      Pupils: Pupils are equal, round, and reactive to light.   Neck:      Vascular: No carotid bruit, hepatojugular reflux or JVD.      Trachea: No tracheal deviation.      Comments: Nl. Carotid upstrokes  Cardiovascular:      Rate and Rhythm: Normal rate and regular rhythm.      Pulses:           Radial pulses are 2+ on the right side and 2+ on the left side.      Heart sounds: S1 normal and S2 normal. No murmur heard.   No friction rub. Gallop present. S4 sounds present. No S3 sounds.    Pulmonary:      Effort: Pulmonary effort is normal.      Breath sounds: Normal breath sounds. No wheezing, rhonchi or rales.      Comments: Nl. Expir. Phase  Nl. Breath sound intensity  Abdominal:      General: Bowel sounds are normal. There is no distension or abdominal bruit.      " Palpations: Abdomen is soft. There is no mass.      Tenderness: There is no abdominal tenderness. There is no guarding or rebound.      Comments: No organomegaly   Musculoskeletal:         General: No tenderness or deformity. Normal range of motion.      Cervical back: Normal range of motion and neck supple.      Right lower leg: Edema present.      Comments: LLE, no edema above leg brace  RLE, 1+ edema to mid calf  Has support stockings on, unable to palpate pedal pulses   Skin:     General: Skin is warm and dry.      Coloration: Skin is not pale.      Findings: No erythema or rash.   Neurological:      Mental Status: He is alert and oriented to person, place, and time.   Psychiatric:         Behavior: Behavior normal.         Thought Content: Thought content normal.         Judgment: Judgment normal.         Procedures      Assessment/Plan   #1.  Coronary artery disease status post stenting of the LAD in the distant past.  Mr. Reyes has no symptoms of ischemia, heart failure, or dysrhythmia.  He had no evidence of ischemia on stress testing last year.  We will continue risk modification as current.    2.  Controlled systemic hypertension.    3.  Treated dyslipidemia.  I would like to decrease atorvastatin to 10 mg daily given extremely low LDL cholesterol.  Lipids will be followed through patient's primary care providers.    4.  Prostate cancer.  Patient is currently undergoing radiation.  I asked Mr. Reyes to try to resume his prior levels of physical activity and to work on losing the 18 pounds he has gained since his appointment last year.    5.  We discussed today symptoms of myocardial ischemia, heart failure, malignant dysrhythmia, peripheral arterial disease, and arterial embolic events in particular TIA or stroke.  The patient will report any of these immediately.  Otherwise he will follow with his primary care providers as instructed in our office in 1 year or as needed.   Diagnosis Plan   1. Coronary  arteriosclerosis in native artery     2. Dyslipidemia     3. Heart murmur     4. Essential hypertension     5. Shortness of breath     6. Other fatigue     7. Syncope and collapse         No follow-ups on file.         Jose Reyes  reports that he has never smoked. He has never used smokeless tobacco.. I have educated him on the risk of diseases from using tobacco products such as cancer, COPD and heart disease.        Advance Care Planning   ACP discussion was held with the patient during this visit. Patient does not have an advance directive, declines further assistance.     Patient's Body mass index is 43.06 kg/m². indicating that he is morbidly obese (BMI > 40 or > 35 with obesity - related health condition). Obesity-related health conditions include the following: hypertension, coronary heart disease, dyslipidemias and prostate ca. Obesity is to be assessed at today's visit. BMI is pcp addressing. We discussed portion control and increasing exercise..       Anna Dan LPN    Scribed for Dr. Gary Cash by Anna Dan LPN July 14, 2021 08:52 EDT         Electronically signed by:            This note is dictated utilizing voice recognition software.  Although this record has been proof read, transcriptional errors may still be present. If questions occur regarding the content of this record please do not hesitate to call our office.

## 2021-07-14 NOTE — PATIENT INSTRUCTIONS
Obesity, Adult  Obesity is the condition of having too much total body fat. Being overweight or obese means that your weight is greater than what is considered healthy for your body size. Obesity is determined by a measurement called BMI. BMI is an estimate of body fat and is calculated from height and weight. For adults, a BMI of 30 or higher is considered obese.  Obesity can lead to other health concerns and major illnesses, including:  · Stroke.  · Coronary artery disease (CAD).  · Type 2 diabetes.  · Some types of cancer, including cancers of the colon, breast, uterus, and gallbladder.  · Osteoarthritis.  · High blood pressure (hypertension).  · High cholesterol.  · Sleep apnea.  · Gallbladder stones.  · Infertility problems.  What are the causes?  Common causes of this condition include:  · Eating daily meals that are high in calories, sugar, and fat.  · Being born with genes that may make you more likely to become obese.  · Having a medical condition that causes obesity, including:  ? Hypothyroidism.  ? Polycystic ovarian syndrome (PCOS).  ? Binge-eating disorder.  ? Cushing syndrome.  · Taking certain medicines, such as steroids, antidepressants, and seizure medicines.  · Not being physically active (sedentary lifestyle).  · Not getting enough sleep.  · Drinking high amounts of sugar-sweetened beverages, such as soft drinks.  What increases the risk?  The following factors may make you more likely to develop this condition:  · Having a family history of obesity.  · Being a woman of  descent.  · Being a man of  descent.  · Living in an area with limited access to:  ? [place], recreation centers, or sidewalks.  ? Healthy food choices, such as grocery stores and farmers' markets.  What are the signs or symptoms?  The main sign of this condition is having too much body fat.  How is this diagnosed?  This condition is diagnosed based on:  · Your BMI. If you are an adult with a BMI of 30 or  higher, you are considered obese.  · Your waist circumference. This measures the distance around your waistline.  · Your skinfold thickness. Your health care provider may gently pinch a fold of your skin and measure it.  You may have other tests to check for underlying conditions.  How is this treated?  Treatment for this condition often includes changing your lifestyle. Treatment may include some or all of the following:  · Dietary changes. This may include developing a healthy meal plan.  · Regular physical activity. This may include activity that causes your heart to beat faster (aerobic exercise) and strength training. Work with your health care provider to design an exercise program that works for you.  · Medicine to help you lose weight if you are unable to lose 1 pound a week after 6 weeks of healthy eating and more physical activity.  · Treating conditions that cause the obesity (underlying conditions).  · Surgery. Surgical options may include gastric banding and gastric bypass. Surgery may be done if:  ? Other treatments have not helped to improve your condition.  ? You have a BMI of 40 or higher.  ? You have life-threatening health problems related to obesity.  Follow these instructions at home:  Eating and drinking    · Follow recommendations from your health care provider about what you eat and drink. Your health care provider may advise you to:  ? Limit fast food, sweets, and processed snack foods.  ? Choose low-fat options, such as low-fat milk instead of whole milk.  ? Eat 5 or more servings of fruits or vegetables every day.  ? Eat at home more often. This gives you more control over what you eat.  ? Choose healthy foods when you eat out.  ? Learn to read food labels. This will help you understand how much food is considered 1 serving.  ? Learn what a healthy serving size is.  ? Keep low-fat snacks available.  ? Limit sugary drinks, such as soda, fruit juice, sweetened iced tea, and flavored  milk.  · Drink enough water to keep your urine pale yellow.  · Do not follow a fad diet. Fad diets can be unhealthy and even dangerous.  Physical activity  · Exercise regularly, as told by your health care provider.  ? Most adults should get up to 150 minutes of moderate-intensity exercise every week.  ? Ask your health care provider what types of exercise are safe for you and how often you should exercise.  · Warm up and stretch before being active.  · Cool down and stretch after being active.  · Rest between periods of activity.  Lifestyle  · Work with your health care provider and a dietitian to set a weight-loss goal that is healthy and reasonable for you.  · Limit your screen time.  · Find ways to reward yourself that do not involve food.  · Do not drink alcohol if:  ? Your health care provider tells you not to drink.  ? You are pregnant, may be pregnant, or are planning to become pregnant.  · If you drink alcohol:  ? Limit how much you use to:  § 0-1 drink a day for women.  § 0-2 drinks a day for men.  ? Be aware of how much alcohol is in your drink. In the [place] one drink equals one 12 oz bottle of beer (355 mL), one 5 oz glass of wine (148 mL), or one 1½ oz glass of hard liquor (44 mL).  General instructions  · Keep a weight-loss journal to keep track of the food you eat and how much exercise you get.  · Take over-the-counter and prescription medicines only as told by your health care provider.  · Take vitamins and supplements only as told by your health care provider.  · Consider joining a support group. Your health care provider may be able to recommend a support group.  · Keep all follow-up visits as told by your health care provider. This is important.  Contact a health care provider if:  · You are unable to meet your weight loss goal after 6 weeks of dietary and lifestyle changes.  Get help right away if you are having:  · Trouble breathing.  · Suicidal thoughts or behaviors.  Summary  · Obesity is the  condition of having too much total body fat.  · Being overweight or obese means that your weight is greater than what is considered healthy for your body size.  · Work with your health care provider and a dietitian to set a weight-loss goal that is healthy and reasonable for you.  · Exercise regularly, as told by your health care provider. Ask your health care provider what types of exercise are safe for you and how often you should exercise.  This information is not intended to replace advice given to you by your health care provider. Make sure you discuss any questions you have with your health care provider.  Document Revised: 08/22/2019 Document Reviewed: 08/22/2019  [employer] Patient Education © 2021 [employer]  MyPlate from [employer]    MyPlate is an outline of a general healthy diet based on the 2010 Dietary Guidelines for Americans, from the [place] Department of Agriculture (USDA). It sets guidelines for how much food you should eat from each food group based on your age, sex, and level of physical activity.  What are tips for following MyPlate?  To follow MyPlate recommendations:  · Eat a wide variety of fruits and vegetables, grains, and protein foods.  · Serve smaller portions and eat less food throughout the day.  · Limit portion sizes to avoid overeating.  · Enjoy your food.  · Get at least 150 minutes of exercise every week. This is about 30 minutes each day, 5 or more days per week.  It can be difficult to have every meal look like MyPlate. Think about MyPlate as eating guidelines for an entire day, rather than each individual meal.  Fruits and vegetables  · Make half of your plate fruits and vegetables.  · Eat many different colors of fruits and vegetables each day.  · For a 2,000 calorie daily food plan, eat:  ? 2½ cups of vegetables every day.  ? 2 cups of fruit every day.  · 1 cup is equal to:  ? 1 cup raw or cooked vegetables.  ? 1 cup raw fruit.  ? 1 medium-sized orange, apple, or banana.  ? 1 cup  100% fruit or vegetable juice.  ? 2 cups raw leafy greens, such as lettuce, spinach, or kale.  ? ½ cup dried fruit.  Grains  · One fourth of your plate should be grains.  · Make at least half of the grains you eat each day whole grains.  · For a 2,000 calorie daily food plan, eat 6 oz of grains every day.  · 1 oz is equal to:  ? 1 slice bread.  ? 1 cup cereal.  ? ½ cup cooked rice, cereal, or pasta.  Protein  · One fourth of your plate should be protein.  · Eat a wide variety of protein foods, including meat, poultry, fish, eggs, beans, nuts, and tofu.  · For a 2,000 calorie daily food plan, eat 5½ oz of protein every day.  · 1 oz is equal to:  ? 1 oz meat, poultry, or fish.  ? ¼ cup cooked beans.  ? 1 egg.  ? ½ oz nuts or seeds.  ? 1 Tbsp peanut butter.  Dairy  · Drink fat-free or low-fat (1%) milk.  · Eat or drink dairy as a side to meals.  · For a 2,000 calorie daily food plan, eat or drink 3 cups of dairy every day.  · 1 cup is equal to:  ? 1 cup milk, yogurt, cottage cheese, or soy milk (soy beverage).  ? 2 oz processed cheese.  ? 1½ oz natural cheese.  Fats, oils, salt, and sugars  · Only small amounts of oils are recommended.  · Avoid foods that are high in calories and low in nutritional value (empty calories), like foods high in fat or added sugars.  · Choose foods that are low in salt (sodium). Choose foods that have less than 140 milligrams (mg) of sodium per serving.  · Drink water instead of sugary drinks. Drink enough water each day to keep your urine pale yellow.  Where to find support  · Work with your health care provider or a nutrition specialist (dietitian) to develop a customized eating plan that is right for you.  · Download an lei (mobile application) to help you track your daily food intake.  Where to find more information  · Go to ChooseMyPlate.gov for more information.  Summary  · MyPlate is a general guideline for healthy eating from the USDA. It is based on the 2010 Dietary Guidelines for  Americans.  · In general, fruits and vegetables should take up ½ of your plate, grains should take up ¼ of your plate, and protein should take up ¼ of your plate.  This information is not intended to replace advice given to you by your health care provider. Make sure you discuss any questions you have with your health care provider.  Document Revised: 05/21/2020 Document Reviewed: 03/19/2018  [employer] Patient Education © 2021 [employer]

## 2022-07-14 ENCOUNTER — LAB (OUTPATIENT)
Dept: LAB | Facility: HOSPITAL | Age: 67
End: 2022-07-14

## 2022-07-14 ENCOUNTER — OFFICE VISIT (OUTPATIENT)
Dept: CARDIOLOGY | Facility: CLINIC | Age: 67
End: 2022-07-14

## 2022-07-14 VITALS
BODY MASS INDEX: 38.93 KG/M2 | HEIGHT: 64 IN | WEIGHT: 228 LBS | DIASTOLIC BLOOD PRESSURE: 75 MMHG | OXYGEN SATURATION: 98 % | SYSTOLIC BLOOD PRESSURE: 124 MMHG | HEART RATE: 75 BPM

## 2022-07-14 DIAGNOSIS — R06.02 SHORTNESS OF BREATH: ICD-10-CM

## 2022-07-14 DIAGNOSIS — R53.82 CHRONIC FATIGUE: ICD-10-CM

## 2022-07-14 DIAGNOSIS — I10 PRIMARY HYPERTENSION: ICD-10-CM

## 2022-07-14 DIAGNOSIS — I25.10 CORONARY ARTERIOSCLEROSIS IN NATIVE ARTERY: Primary | ICD-10-CM

## 2022-07-14 DIAGNOSIS — E78.5 DYSLIPIDEMIA: ICD-10-CM

## 2022-07-14 DIAGNOSIS — R55 SYNCOPE AND COLLAPSE: ICD-10-CM

## 2022-07-14 DIAGNOSIS — R01.1 HEART MURMUR: ICD-10-CM

## 2022-07-14 LAB — TSH SERPL DL<=0.05 MIU/L-ACNC: 3.93 UIU/ML (ref 0.27–4.2)

## 2022-07-14 PROCEDURE — 84443 ASSAY THYROID STIM HORMONE: CPT

## 2022-07-14 PROCEDURE — 93000 ELECTROCARDIOGRAM COMPLETE: CPT | Performed by: INTERNAL MEDICINE

## 2022-07-14 PROCEDURE — 99214 OFFICE O/P EST MOD 30 MIN: CPT | Performed by: INTERNAL MEDICINE

## 2022-07-14 PROCEDURE — 36415 COLL VENOUS BLD VENIPUNCTURE: CPT

## 2022-07-14 RX ORDER — BETAMETHASONE DIPROPIONATE 0.5 MG/G
CREAM TOPICAL
COMMUNITY
Start: 2022-06-07

## 2022-07-14 RX ORDER — LANOLIN ALCOHOL/MO/W.PET/CERES
400 CREAM (GRAM) TOPICAL DAILY
COMMUNITY

## 2022-07-14 RX ORDER — DULAGLUTIDE 3 MG/.5ML
INJECTION, SOLUTION SUBCUTANEOUS WEEKLY
COMMUNITY
Start: 2022-05-31

## 2022-07-14 RX ORDER — ROSUVASTATIN CALCIUM 20 MG/1
TABLET, COATED ORAL NIGHTLY
COMMUNITY
Start: 2022-05-03

## 2022-07-14 RX ORDER — CHOLECALCIFEROL (VITAMIN D3) 125 MCG
500 CAPSULE ORAL DAILY
COMMUNITY

## 2022-07-14 RX ORDER — MULTIPLE VITAMINS W/ MINERALS TAB 9MG-400MCG
1 TAB ORAL DAILY
COMMUNITY

## 2022-07-14 RX ORDER — TRIAMCINOLONE ACETONIDE 1 MG/G
CREAM TOPICAL
COMMUNITY
Start: 2022-06-02

## 2022-07-14 NOTE — PROGRESS NOTES
Subjective   Jose Reyes is a 66 y.o. male     Chief Complaint   Patient presents with   • Follow-up     Here for yearly f/u   • Coronary Artery Disease   • Hyperlipidemia   • Hypertension   • Heart Murmur   • Syncope       PROBLEM LIST:     1. Coronary artery disease, stenting November 2010  1.1 Stress test, 8-, no ischemia  2. Hypertension  3. Dyslipidemia  4. Renal dysfunction  5. Prostate CA, radiation completed in July 2021    Specialty Problems        Cardiology Problems    Abnormal EKG        Coronary arteriosclerosis in native artery        Hypertension        Heart murmur                HPI:  Mr. Reyes returns for follow-up on the above.    He has done well over the last year.  After his last visit we performed stress testing because of an absent anginal warning mechanism and an inability to assess functional status because of orthopedic issues.  The study demonstrated no evidence of ischemia and preserved LV systolic function.  Mr. Reyes can perform all of his usual activities without difficulty and with no change in his sense of breathlessness or capacity.  He denies orthopnea, PND, or change in lower extremity edema.  He does not palpitate, and he has no dizziness, presyncope, or syncope.    Mr. Reyes was seen in the emergency room with [facility] and was diagnosed with epididymitis.  Labs at that time demonstrated BUN and creatinine of 16 and 1.25 improved from prior, a glucose of 176 and was otherwise unremarkable.  Most recent lipids demonstrated a total cholesterol of 93, triglycerides 198, HDL 25, LDL 98.  Mr. Arnold does not claudicate or have other symptoms of peripheral arterial disease and he has no symptoms of arterial embolic events include no symptoms of TIA or stroke.  Blood pressures have remained well controlled.  The patient has no intercurrent illnesses, injuries, or hospitalizations.                          PRIOR MEDICATIONS    Current Outpatient  Medications on File Prior to Visit   Medication Sig Dispense Refill   • amLODIPine (NORVASC) 5 MG tablet Take 5 mg by mouth Daily.     • Ascorbic Acid (VITAMIN C) 500 MG capsule Take  by mouth daily.     • aspirin 81 MG tablet Take  by mouth daily.     • betamethasone dipropionate 0.05 % cream APPLY TO AFFECTED AREA TWICE A DAY     • cloNIDine (CATAPRES) 0.1 MG tablet Take 0.1 mg by mouth Every Night.     • fenofibrate 160 MG tablet Take 160 mg by mouth Daily.     • FERROUS SULFATE PO Take 375 mg by mouth 3 (Three) Times a Day.     • finasteride (PROSCAR) 5 MG tablet Daily.  5   • fluticasone (FLONASE) 50 MCG/ACT nasal spray 1 spray by Each Nare route Daily.     • folic acid (FOLVITE) 400 MCG tablet Take 400 mcg by mouth Daily.     • glipiZIDE (GLUCOTROL XL) 5 MG ER tablet Daily.     • losartan (COZAAR) 100 MG tablet Take  by mouth daily.     • metFORMIN (GLUCOPHAGE) 500 MG tablet 2 (Two) Times a Day.     • multivitamin with minerals (MULTIVITAMIN ADULT PO) Take 1 tablet by mouth Daily.     • omeprazole (PriLOSEC) 20 MG capsule Take  by mouth 2 (two) times a day.     • rosuvastatin (CRESTOR) 20 MG tablet Every Night.     • sucralfate (CARAFATE) 1 g tablet Take  by mouth 4 (four) times a day.     • triamcinolone (KENALOG) 0.1 % cream APPLY TO AREA TWICE A DAY     • Trulicity 3 MG/0.5ML solution pen-injector 1 (One) Time Per Week.     • vitamin B-12 (CYANOCOBALAMIN) 500 MCG tablet Take 500 mcg by mouth Daily.     • [DISCONTINUED] atorvastatin (LIPITOR) 10 MG tablet Take 1 tablet by mouth Every Night. 30 tablet 11     No current facility-administered medications on file prior to visit.       ALLERGIES:    Patient has no known allergies.    PAST MEDICAL HISTORY:    Past Medical History:   Diagnosis Date   • Cancer (HCC)     prostate CA with radiation   • Coronary artery disease    • Hyperlipidemia    • Hypertension        SURGICAL HISTORY:    Past Surgical History:   Procedure Laterality Date   • COLONOSCOPY W/ BIOPSIES  "    • CORONARY ANGIOPLASTY     • CORONARY STENT PLACEMENT      x1   • CYSTOSCOPY W/ URETEROSCOPY W/ LITHOTRIPSY     • INCISION AND DRAINAGE ARM     • SMALL INTESTINE SURGERY     • STOMACH SURGERY     • UMBILICAL HERNIA REPAIR         SOCIAL HISTORY:    Social History     Socioeconomic History   • Marital status:    Tobacco Use   • Smoking status: Never Smoker   • Smokeless tobacco: Never Used   Substance and Sexual Activity   • Alcohol use: No   • Drug use: No   • Sexual activity: Defer       FAMILY HISTORY:    Family History   Problem Relation Age of Onset   • Heart disease Mother    • Hypertension Mother    • Diabetes Mother    • Hyperlipidemia Mother        Review of Systems   Constitutional: Negative.    HENT: Negative.    Eyes: Positive for visual disturbance (glasses).   Respiratory: Negative.    Cardiovascular: Negative.    Gastrointestinal: Negative.    Endocrine: Negative.    Genitourinary: Negative.    Musculoskeletal: Positive for arthralgias and myalgias.   Skin: Negative.    Allergic/Immunologic: Positive for environmental allergies.   Neurological: Negative.         Denies stroke like sx's   Hematological: Negative.    Psychiatric/Behavioral: Negative.        VISIT VITALS:  Vitals:    07/14/22 0929   BP: 124/75   BP Location: Left arm   Patient Position: Sitting   Pulse: 75   SpO2: 98%   Weight: 103 kg (228 lb)   Height: 162.6 cm (64\")      /75 (BP Location: Left arm, Patient Position: Sitting)   Pulse 75   Ht 162.6 cm (64\")   Wt 103 kg (228 lb)   SpO2 98%   BMI 39.14 kg/m²     RECENT LABS:    Objective       Physical Exam  Vitals and nursing note reviewed.   Constitutional:       General: He is not in acute distress.     Appearance: He is well-developed.   HENT:      Head: Normocephalic and atraumatic.   Eyes:      Conjunctiva/sclera: Conjunctivae normal.      Pupils: Pupils are equal, round, and reactive to light.   Neck:      Vascular: No carotid bruit, hepatojugular reflux or " JVD.      Trachea: No tracheal deviation.      Comments: Nl. Carotid upstrokes  Cardiovascular:      Rate and Rhythm: Normal rate and regular rhythm.      Pulses:           Radial pulses are 2+ on the right side and 2+ on the left side.      Heart sounds: S1 normal and S2 normal. Murmur heard.     No friction rub. Gallop present. S4 sounds present. No S3 sounds.      Comments: 1-2/6 TR  No MR  No AI    Pulmonary:      Effort: Pulmonary effort is normal.      Breath sounds: Normal breath sounds. No wheezing, rhonchi or rales.      Comments: Nl. Expir. Phase  Nl. Breath sound intensity  Abdominal:      General: Bowel sounds are normal. There is no distension or abdominal bruit.      Palpations: Abdomen is soft. There is no mass.      Tenderness: There is no abdominal tenderness. There is no guarding or rebound.      Comments: No organomegaly   Musculoskeletal:         General: No tenderness or deformity. Normal range of motion.      Cervical back: Normal range of motion and neck supple.      Comments: BLE not assessed due to bilat. Leg braces   Skin:     General: Skin is warm and dry.      Coloration: Skin is not pale.      Findings: No erythema or rash.   Neurological:      Mental Status: He is alert and oriented to person, place, and time.   Psychiatric:         Behavior: Behavior normal.         Thought Content: Thought content normal.         Judgment: Judgment normal.           ECG 12 Lead    Date/Time: 7/14/2022 9:44 AM  Performed by: Gary Cash MD  Authorized by: Gary Cash MD   Comparison: compared with previous ECG from 7/15/2020  Similar to previous ECG  Comments: Normal sinus rhythm at 70.  Nonspecific T wave changes.  No significant change from prior.              Assessment & Plan   #1.  Coronary artery disease.  The patient has no symptoms and had low risk stress testing most recently.  We will continue close clinical follow-up and risk factor modification.    2.  Controlled systemic  hypertension.    3.  Treated dyslipidemia.  I think would be reasonable to continue high-dose statin as the patient already has established diabetes which seems reasonably well controlled and that he tolerates medication without difficulty.    4.  Diabetes.  I think the patient would benefit from SGLT2 inhibitor therapy because of cardio and renal protective effects.  This medication might allow discontinuation of glipizide which has adverse cardiac effects.  We will defer to Jackelyn Venegas in that regard.    5.  We discussed today symptoms of myocardial ischemia, heart failure, malignant dysrhythmia and the patient will report any of these immediately.  He understands any acute neurologic events should prompt activation of emergency medical services.  Otherwise he will follow-up with Jackelyn Venegas per her instructions and in our office in 1 year.   Diagnosis Plan   1. Coronary arteriosclerosis in native artery     2. Dyslipidemia     3. Heart murmur     4. Primary hypertension     5. Shortness of breath     6. Chronic fatigue     7. Syncope and collapse         No follow-ups on file.         Jose Reyes  reports that he has never smoked. He has never used smokeless tobacco.. I have educated him on the risk of diseases from using tobacco products such as cancer, COPD and heart disease.          Advance Care Planning   ACP discussion was held with the patient during this visit. Patient does not have an advance directive, declines further assistance.      Class 2 Severe Obesity (BMI >=35 and <=39.9). Obesity-related health conditions include the following: hypertension, coronary heart disease, dyslipidemias and prostate CA. Obesity is to be assessed at today's visit. BMI is pcp addressing. We discussed portion control and increasing exercise.             Electronically signed by:    Scribed for Gary Cash MD by Anna Dan LPN on July 14, 2022  at 09:43 EDT    IGary MD personally  performed the services described in this documentation as scribed by the above named individual in my presence, and it is both accurate and complete. July 14, 2022 09:43 EDT      This note is dictated utilizing voice recognition software.  Although this record has been proof read, transcriptional errors may still be present. If questions occur regarding the content of this record please do not hesitate to call our office.

## 2023-09-20 ENCOUNTER — OFFICE VISIT (OUTPATIENT)
Dept: CARDIOLOGY | Facility: CLINIC | Age: 68
End: 2023-09-20
Payer: MEDICARE

## 2023-09-20 VITALS
BODY MASS INDEX: 34.08 KG/M2 | HEART RATE: 59 BPM | WEIGHT: 199.6 LBS | HEIGHT: 64 IN | SYSTOLIC BLOOD PRESSURE: 122 MMHG | OXYGEN SATURATION: 96 % | DIASTOLIC BLOOD PRESSURE: 78 MMHG

## 2023-09-20 DIAGNOSIS — E78.5 DYSLIPIDEMIA: ICD-10-CM

## 2023-09-20 DIAGNOSIS — I10 PRIMARY HYPERTENSION: ICD-10-CM

## 2023-09-20 DIAGNOSIS — I25.10 CORONARY ARTERIOSCLEROSIS IN NATIVE ARTERY: Primary | ICD-10-CM

## 2023-09-20 DIAGNOSIS — R06.02 SHORTNESS OF BREATH: ICD-10-CM

## 2023-09-20 DIAGNOSIS — R53.82 CHRONIC FATIGUE: ICD-10-CM

## 2023-09-20 DIAGNOSIS — R01.1 HEART MURMUR: ICD-10-CM

## 2023-09-20 DIAGNOSIS — R55 SYNCOPE AND COLLAPSE: ICD-10-CM

## 2023-09-20 PROCEDURE — 3074F SYST BP LT 130 MM HG: CPT | Performed by: INTERNAL MEDICINE

## 2023-09-20 PROCEDURE — 99214 OFFICE O/P EST MOD 30 MIN: CPT | Performed by: INTERNAL MEDICINE

## 2023-09-20 PROCEDURE — 3078F DIAST BP <80 MM HG: CPT | Performed by: INTERNAL MEDICINE

## 2023-09-20 RX ORDER — EMPAGLIFLOZIN 25 MG/1
25 TABLET, FILM COATED ORAL DAILY
COMMUNITY
Start: 2023-07-07

## 2023-09-20 RX ORDER — INSULIN GLARGINE 100 [IU]/ML
6 INJECTION, SOLUTION SUBCUTANEOUS DAILY
COMMUNITY
Start: 2023-09-04

## 2023-09-20 RX ORDER — FESOTERODINE FUMARATE 8 MG/1
8 TABLET, EXTENDED RELEASE ORAL
COMMUNITY
Start: 2023-09-15

## 2023-09-20 RX ORDER — TRIAMCINOLONE ACETONIDE 5 MG/G
1 CREAM TOPICAL 2 TIMES DAILY
COMMUNITY
Start: 2023-08-29

## 2023-09-20 RX ORDER — DULAGLUTIDE 4.5 MG/.5ML
INJECTION, SOLUTION SUBCUTANEOUS WEEKLY
COMMUNITY
Start: 2023-09-04

## 2023-09-20 NOTE — PROGRESS NOTES
Subjective   Jose Reyes is a 67 y.o. male     Chief Complaint   Patient presents with    Follow-up     Here for yearly f/u    Coronary Artery Disease    Hyperlipidemia    Hypertension    Heart Murmur       PROBLEM LIST:     1. Coronary artery disease, stenting November 2010  1.1 Stress test, 8-, no ischemia  2. Hypertension  3. Dyslipidemia  4. Renal dysfunction  5. Prostate CA, radiation completed in July 2021    Specialty Problems          Cardiology Problems    Abnormal EKG        Coronary arteriosclerosis in native artery        Hypertension        Heart murmur             HPI:    Mr. Reyes returns for follow-up on the above.    He developed melena and was treated for upper GI bleed.  In that same timeframe he noted increased weakness, fatigue, and exertional dyspnea.  His feeding resolved post therapy but has recurred.  Despite that he denies chest pain, orthopnea, PND, or lower extremity edema.  He senses no palpitations and has no dizziness, presyncope, or syncope.    Mr. Bhandari denies any symptoms of TIA or stroke and he has no symptoms to suggest clonidine.  He has been compliant with medications.  Blood pressures have been well controlled.  He states that he had recent labs done at [facility] Venegas's office.  Glipizide was changed to Jardiance and the patient has tolerated that maneuver without complications.                  PRIOR MEDICATIONS    Current Outpatient Medications on File Prior to Visit   Medication Sig Dispense Refill    amLODIPine (NORVASC) 5 MG tablet Take 1 tablet by mouth Daily.      Ascorbic Acid (VITAMIN C) 500 MG capsule Take  by mouth daily.      aspirin 81 MG tablet Take  by mouth daily.      cloNIDine (CATAPRES) 0.1 MG tablet Take 1 tablet by mouth Every Night.      fenofibrate 160 MG tablet Take 1 tablet by mouth Daily.      FERROUS SULFATE PO Take 375 mg by mouth 3 (Three) Times a Day.      fesoterodine fumarate (TOVIAZ ER) 8 MG tablet sustained-release 24 hour tablet  Take 1 tablet by mouth Daily.      finasteride (PROSCAR) 5 MG tablet Daily.  5    folic acid (FOLVITE) 400 MCG tablet Take 1 tablet by mouth Daily.      Insulin Glargine (BASAGLAR KWIKPEN) 100 UNIT/ML injection pen Inject 6 Units under the skin into the appropriate area as directed Daily.      Jardiance 25 MG tablet tablet Take 1 tablet by mouth Daily.      losartan (COZAAR) 100 MG tablet Take  by mouth daily.      multivitamin with minerals tablet tablet Take 1 tablet by mouth Daily.      omeprazole (PriLOSEC) 20 MG capsule Take  by mouth 2 (two) times a day.      rosuvastatin (CRESTOR) 20 MG tablet Every Night.      sucralfate (CARAFATE) 1 g tablet Take  by mouth 4 (four) times a day.      triamcinolone (KENALOG) 0.5 % cream Apply 1 application  topically to the appropriate area as directed 2 (Two) Times a Day.      Trulicity 4.5 MG/0.5ML solution pen-injector 1 (One) Time Per Week.      vitamin B-12 (CYANOCOBALAMIN) 500 MCG tablet Take 1 tablet by mouth Daily.      fluticasone (FLONASE) 50 MCG/ACT nasal spray 1 spray by Each Nare route Daily.      glipiZIDE (GLUCOTROL XL) 5 MG ER tablet Daily.      [DISCONTINUED] betamethasone dipropionate 0.05 % cream APPLY TO AFFECTED AREA TWICE A DAY      [DISCONTINUED] metFORMIN (GLUCOPHAGE) 500 MG tablet 2 (Two) Times a Day.      [DISCONTINUED] triamcinolone (KENALOG) 0.1 % cream APPLY TO AREA TWICE A DAY      [DISCONTINUED] Trulicity 3 MG/0.5ML solution pen-injector 1 (One) Time Per Week.       No current facility-administered medications on file prior to visit.       ALLERGIES:    Patient has no known allergies.    PAST MEDICAL HISTORY:    Past Medical History:   Diagnosis Date    Cancer     prostate CA with radiation    Coronary artery disease     Hyperlipidemia     Hypertension        SURGICAL HISTORY:    Past Surgical History:   Procedure Laterality Date    COLONOSCOPY W/ BIOPSIES      CORONARY ANGIOPLASTY      CORONARY STENT PLACEMENT      x1    CYSTOSCOPY W/  "URETEROSCOPY W/ LITHOTRIPSY      INCISION AND DRAINAGE ARM      SMALL INTESTINE SURGERY      STOMACH SURGERY      UMBILICAL HERNIA REPAIR         SOCIAL HISTORY:    Social History     Socioeconomic History    Marital status:    Tobacco Use    Smoking status: Never    Smokeless tobacco: Never   Substance and Sexual Activity    Alcohol use: No    Drug use: No    Sexual activity: Defer       FAMILY HISTORY:    Family History   Problem Relation Age of Onset    Heart disease Mother     Hypertension Mother     Diabetes Mother     Hyperlipidemia Mother        Review of Systems   Constitutional:  Positive for fatigue.   HENT: Negative.     Eyes:  Positive for visual disturbance (glasses).   Respiratory:  Positive for shortness of breath.    Cardiovascular: Negative.    Gastrointestinal:  Positive for blood in stool. Negative for constipation and diarrhea.   Endocrine: Negative.    Genitourinary: Negative.    Musculoskeletal: Negative.    Skin: Negative.    Allergic/Immunologic: Negative.    Neurological: Negative.    Hematological: Negative.  Bruises/bleeds easily.   Psychiatric/Behavioral:  Positive for sleep disturbance.      VISIT VITALS:  Vitals:    09/20/23 1257   BP: 122/78   BP Location: Left arm   Patient Position: Sitting   Pulse: 59   SpO2: 96%   Weight: 90.5 kg (199 lb 9.6 oz)   Height: 162.6 cm (64\")      /78 (BP Location: Left arm, Patient Position: Sitting)   Pulse 59   Ht 162.6 cm (64\")   Wt 90.5 kg (199 lb 9.6 oz)   SpO2 96%   BMI 34.26 kg/m²     RECENT LABS:    Objective       Physical Exam  Vitals and nursing note reviewed.   Constitutional:       General: He is not in acute distress.     Appearance: He is well-developed.   HENT:      Head: Normocephalic and atraumatic.   Eyes:      Conjunctiva/sclera: Conjunctivae normal.      Pupils: Pupils are equal, round, and reactive to light.   Neck:      Vascular: No carotid bruit, hepatojugular reflux or JVD.      Trachea: No tracheal deviation. "      Comments: Nl. Carotid upstrokes  Cardiovascular:      Rate and Rhythm: Normal rate and regular rhythm.      Pulses:           Radial pulses are 2+ on the right side and 2+ on the left side.      Heart sounds: S1 normal and S2 normal. No murmur heard.    No friction rub. Gallop present. S4 sounds present. No S3 sounds.   Pulmonary:      Effort: Pulmonary effort is normal.      Breath sounds: Normal breath sounds. No wheezing, rhonchi or rales.      Comments: Nl. Expir. Phase  Nl. Breath sound intensity  Abdominal:      General: Bowel sounds are normal. There is no distension or abdominal bruit.      Palpations: Abdomen is soft. There is no mass.      Tenderness: There is no abdominal tenderness. There is no guarding or rebound.      Comments: No organomegaly   Musculoskeletal:         General: No tenderness or deformity. Normal range of motion.      Cervical back: Normal range of motion and neck supple.      Right lower leg: Edema present.      Left lower leg: Edema present.      Comments: LLE, trace to hardly no edema   RLE, trace edema  Pedal pulses not assessed due to tall foot braces. Denies sx's of claudication   Skin:     General: Skin is warm and dry.      Coloration: Skin is not pale.      Findings: No erythema or rash.   Neurological:      Mental Status: He is alert and oriented to person, place, and time.   Psychiatric:         Behavior: Behavior normal.         Thought Content: Thought content normal.         Judgment: Judgment normal.       Procedures      Assessment & Plan   #1.  Coronary artery disease with stenting in the distant past.  Was felt that exertional dyspnea could be an anginal equivalent but stress test performed in 2020 demonstrated no evidence of ischemia.  Symptoms are unchanged from that time.  We will continue to monitor medically and continue with risk factor modification.    2.  GI bleed.  The patient has a follow-up appointment with Dr. Mcnally.    3.  Controlled systemic  hypertension.    4.  Treated dyslipidemia.    5.  Mild chronic renal insufficiency.    6.  Mr. Reyes will follow with [facility] Venegas as instructed, and we will plan on seeing him in follow-up in 1 year or on appearing basis as discussed.   Diagnosis Plan   1. Coronary arteriosclerosis in native artery        2. Dyslipidemia        3. Heart murmur        4. Primary hypertension        5. Shortness of breath        6. Syncope and collapse        7. Chronic fatigue            No follow-ups on file.         Jose Reyes  reports that he has never smoked. He has never used smokeless tobacco.. I have educated him on the risk of diseases from using tobacco products such as cancer, COPD, and heart disease.               BMI is >= 30 and <35. (Class 1 Obesity). The following options were offered after discussion;: pcp addressing       Advance Care Planning   ACP discussion was held with the patient during this visit. Patient does not have an advance directive, declines further assistance.            Electronically signed by:    Scribed for Gary Cash MD by Anna Dan LPN on September 20, 2023  at 13:05 EDT    I, Gary Cash MD personally performed the services described in this documentation as scribed by the above named individual in my presence, and it is both accurate and complete. September 20, 2023 13:05 EDT      Dictated Utilizing Dragon Dictation: Part of this note may be an electronic transcription/translation of spoken language to printed text using the Dragon Dictation System.

## 2024-09-11 ENCOUNTER — TELEPHONE (OUTPATIENT)
Dept: CARDIOLOGY | Facility: CLINIC | Age: 69
End: 2024-09-11
Payer: MEDICARE

## 2024-09-11 NOTE — TELEPHONE ENCOUNTER
Received cardiac clearance request from DR. SILVIANO HERNANDEZ stating pt has REVISION OF GASTRIC SURGERY and is requiring a cardiac clearance. Placed cardiac clearance request in PAT'S inbox to review and address with provider.

## 2024-09-16 ENCOUNTER — TELEPHONE (OUTPATIENT)
Dept: CARDIOLOGY | Facility: CLINIC | Age: 69
End: 2024-09-16
Payer: MEDICARE

## 2024-09-25 ENCOUNTER — OFFICE VISIT (OUTPATIENT)
Dept: CARDIOLOGY | Facility: CLINIC | Age: 69
End: 2024-09-25
Payer: MEDICARE

## 2024-09-25 VITALS
OXYGEN SATURATION: 98 % | HEIGHT: 64 IN | SYSTOLIC BLOOD PRESSURE: 137 MMHG | DIASTOLIC BLOOD PRESSURE: 84 MMHG | BODY MASS INDEX: 34.59 KG/M2 | HEART RATE: 92 BPM | WEIGHT: 202.6 LBS

## 2024-09-25 DIAGNOSIS — E78.5 DYSLIPIDEMIA: ICD-10-CM

## 2024-09-25 DIAGNOSIS — R01.1 HEART MURMUR: ICD-10-CM

## 2024-09-25 DIAGNOSIS — I25.10 CORONARY ARTERIOSCLEROSIS IN NATIVE ARTERY: Primary | ICD-10-CM

## 2024-09-25 DIAGNOSIS — R55 SYNCOPE AND COLLAPSE: ICD-10-CM

## 2024-09-25 DIAGNOSIS — R06.02 SHORTNESS OF BREATH: ICD-10-CM

## 2024-09-25 DIAGNOSIS — I10 PRIMARY HYPERTENSION: ICD-10-CM

## 2024-09-25 PROBLEM — D50.0 IRON DEFICIENCY ANEMIA DUE TO CHRONIC BLOOD LOSS: Status: ACTIVE | Noted: 2024-04-28

## 2024-09-25 PROCEDURE — 99214 OFFICE O/P EST MOD 30 MIN: CPT | Performed by: INTERNAL MEDICINE

## 2024-09-25 PROCEDURE — 3075F SYST BP GE 130 - 139MM HG: CPT | Performed by: INTERNAL MEDICINE

## 2024-09-25 PROCEDURE — 93000 ELECTROCARDIOGRAM COMPLETE: CPT | Performed by: INTERNAL MEDICINE

## 2024-09-25 PROCEDURE — 3079F DIAST BP 80-89 MM HG: CPT | Performed by: INTERNAL MEDICINE

## 2024-09-25 RX ORDER — PANTOPRAZOLE SODIUM 40 MG/1
40 TABLET, DELAYED RELEASE ORAL DAILY
COMMUNITY

## 2024-09-25 RX ORDER — MISOPROSTOL 200 UG/1
200 TABLET ORAL 4 TIMES DAILY
COMMUNITY

## 2024-09-25 RX ORDER — LORATADINE 10 MG/1
10 TABLET ORAL DAILY
COMMUNITY